# Patient Record
Sex: MALE | Race: BLACK OR AFRICAN AMERICAN | NOT HISPANIC OR LATINO | Employment: FULL TIME | URBAN - METROPOLITAN AREA
[De-identification: names, ages, dates, MRNs, and addresses within clinical notes are randomized per-mention and may not be internally consistent; named-entity substitution may affect disease eponyms.]

---

## 2017-03-06 ENCOUNTER — OFFICE VISIT (OUTPATIENT)
Dept: INTERNAL MEDICINE | Facility: CLINIC | Age: 52
End: 2017-03-06
Payer: COMMERCIAL

## 2017-03-06 VITALS
SYSTOLIC BLOOD PRESSURE: 132 MMHG | DIASTOLIC BLOOD PRESSURE: 76 MMHG | BODY MASS INDEX: 23.92 KG/M2 | WEIGHT: 170.88 LBS | HEIGHT: 71 IN | TEMPERATURE: 97 F | HEART RATE: 82 BPM | OXYGEN SATURATION: 98 %

## 2017-03-06 DIAGNOSIS — B18.2 HEP C W/O COMA, CHRONIC: ICD-10-CM

## 2017-03-06 DIAGNOSIS — B37.2 CUTANEOUS CANDIDIASIS: Primary | ICD-10-CM

## 2017-03-06 PROCEDURE — 99213 OFFICE O/P EST LOW 20 MIN: CPT | Mod: S$GLB,,, | Performed by: FAMILY MEDICINE

## 2017-03-06 PROCEDURE — 99999 PR PBB SHADOW E&M-EST. PATIENT-LVL III: CPT | Mod: PBBFAC,,, | Performed by: FAMILY MEDICINE

## 2017-03-06 PROCEDURE — 1160F RVW MEDS BY RX/DR IN RCRD: CPT | Mod: S$GLB,,, | Performed by: FAMILY MEDICINE

## 2017-03-06 RX ORDER — OXYCODONE HYDROCHLORIDE 5 MG/1
5 CAPSULE ORAL EVERY 4 HOURS PRN
COMMUNITY
End: 2017-04-18

## 2017-03-06 RX ORDER — OXYCODONE AND ACETAMINOPHEN 5; 325 MG/1; MG/1
TABLET ORAL
COMMUNITY
Start: 2017-02-23 | End: 2017-04-18

## 2017-03-06 RX ORDER — NYSTATIN AND TRIAMCINOLONE ACETONIDE 100000; 1 [USP'U]/G; MG/G
CREAM TOPICAL 2 TIMES DAILY
Qty: 15 G | Refills: 0 | Status: SHIPPED | OUTPATIENT
Start: 2017-03-06 | End: 2018-01-05

## 2017-03-06 RX ORDER — METHOCARBAMOL 500 MG/1
TABLET, FILM COATED ORAL
COMMUNITY
Start: 2017-02-24 | End: 2018-01-05

## 2017-03-06 RX ORDER — ACYCLOVIR 400 MG/1
TABLET ORAL
COMMUNITY
Start: 2017-02-16 | End: 2018-01-05 | Stop reason: SDUPTHER

## 2017-03-06 RX ORDER — FLUCONAZOLE 150 MG/1
150 TABLET ORAL DAILY
Qty: 1 TABLET | Refills: 0 | Status: SHIPPED | OUTPATIENT
Start: 2017-03-06 | End: 2017-03-07

## 2017-03-06 NOTE — MR AVS SNAPSHOT
[facility] Internal Medicine  [address] [facility] [address] [place] [address]  Phone: [phone]  Fax: [phone]                  Zane Allison   3/6/2017 2:20 PM   Office Visit    Description:  Male : 1965   Provider:  Ana Luisa Cohen MD   Department:  [facility] - Internal Medicine           Reason for Visit     Rash           Diagnoses this Visit        Comments    Cutaneous candidiasis    -  Primary     Hep C w/o coma, chronic                To Do List           Goals (5 Years of Data)     None      Follow-Up and Disposition     Return if symptoms worsen or fail to improve.       These Medications        Disp Refills Start End    nystatin-triamcinolone (MYCOLOG II) cream 15 g 0 3/6/2017     Apply topically 2 (two) times daily. - Topical (Top)    Pharmacy: [facility] [address] [place] [address] Ph #: [phone]       fluconazole (DIFLUCAN) 150 MG Tab 1 tablet 0 3/6/2017 3/7/2017    Take 1 tablet (150 mg total) by mouth once daily. - Oral    Pharmacy: [facility] [address] [place] [address] Ph #: [phone]         [facility] On Call     [facility] On Call Nurse Care Line -  Assistance  Registered nurses in the [facility] provide clinical advisement, health education, appointment booking, and other advisory services.  Call for this free service at [phone].             Medications           Message regarding Medications     Verify the changes and/or additions to your medication regime listed below are the same as discussed with your clinician today.  If any of these changes or additions are incorrect, please notify your healthcare provider.        START taking these NEW medications        Refills    nystatin-triamcinolone (MYCOLOG II) cream 0    Sig: Apply topically 2 (two) times daily.    Class: Normal    Route: Topical (Top)    fluconazole (DIFLUCAN) 150 MG Tab 0    Sig: Take 1 tablet (150 mg total) by mouth once daily.    Class: Normal    Route: Oral  "     STOP taking these medications     ledipasvir-sofosbuvir  mg Tab Take 1 tablet by mouth once daily.    tadalafil (CIALIS) 20 MG Tab Take 1 tablet (20 mg total) by mouth once daily.           Verify that the below list of medications is an accurate representation of the medications you are currently taking.  If none reported, the list may be blank. If incorrect, please contact your healthcare provider. Carry this list with you in case of emergency.           Current Medications     acyclovir (ZOVIRAX) 400 MG tablet     oxycodone (OXY-IR) 5 mg Cap Take 5 mg by mouth every 4 (four) hours as needed for Pain.    oxycodone-acetaminophen (PERCOCET) 5-325 mg per tablet     fluconazole (DIFLUCAN) 150 MG Tab Take 1 tablet (150 mg total) by mouth once daily.    methocarbamol (ROBAXIN) 500 MG Tab     nystatin-triamcinolone (MYCOLOG II) cream Apply topically 2 (two) times daily.           Clinical Reference Information           Your Vitals Were     BP Pulse Temp Height    132/76 (BP Location: Left arm, Patient Position: Sitting, BP Method: Manual) 82 96.7 °F (35.9 °C) (Tympanic) 5' 11" (1.803 m)    Weight SpO2 BMI    77.5 kg (170 lb 13.7 oz) 98% 23.83 kg/m2      Blood Pressure          Most Recent Value    BP  132/76      Allergies as of 3/6/2017     No Known Allergies      Immunizations Administered on Date of Encounter - 3/6/2017     None      Smoking Cessation     If you would like to quit smoking:   You may be eligible for free services if you are a [place] resident and started smoking cigarettes before September 1, 1988.  Call the [employer]oking [employer]essation [employer] (SCT) toll free at (567) 003-1048 or (120) 544-6090.   Call 7-583-QUIT-NOW if you do not meet the above criteria.            Language Assistance Services     ATTENTION: Language assistance services are available, free of charge. Please call [phone].      ATENCIÓN: Si habla español, tiene a elizondo disposición servicios gratuitos de asistencia lingüística. " Jaxon blas [phone].     GABINO Ý: N?u b?n nói Ti?ng Vi?t, có các d?ch v? h? tr? ngôn ng? mi?n phí dành cho b?n. G?i s? [phone].         [facility] - [facility] complies with applicable Federal civil rights laws and does not discriminate on the basis of race, color, national origin, age, disability, or sex.
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
## 2017-04-18 ENCOUNTER — OFFICE VISIT (OUTPATIENT)
Dept: INTERNAL MEDICINE | Facility: CLINIC | Age: 52
End: 2017-04-18
Payer: COMMERCIAL

## 2017-04-18 VITALS
OXYGEN SATURATION: 97 % | TEMPERATURE: 98 F | HEIGHT: 71 IN | WEIGHT: 179 LBS | SYSTOLIC BLOOD PRESSURE: 128 MMHG | BODY MASS INDEX: 25.06 KG/M2 | HEART RATE: 80 BPM | DIASTOLIC BLOOD PRESSURE: 72 MMHG

## 2017-04-18 DIAGNOSIS — Z00.00 ROUTINE GENERAL MEDICAL EXAMINATION AT A HEALTH CARE FACILITY: Primary | ICD-10-CM

## 2017-04-18 DIAGNOSIS — K64.4 EXTERNAL HEMORRHOIDS: ICD-10-CM

## 2017-04-18 PROCEDURE — 99396 PREV VISIT EST AGE 40-64: CPT | Mod: S$GLB,,, | Performed by: INTERNAL MEDICINE

## 2017-04-18 PROCEDURE — 99999 PR PBB SHADOW E&M-EST. PATIENT-LVL III: CPT | Mod: PBBFAC,,, | Performed by: INTERNAL MEDICINE

## 2017-04-18 RX ORDER — ZOLPIDEM TARTRATE 5 MG/1
5 TABLET ORAL NIGHTLY PRN
Qty: 10 TABLET | Refills: 5 | Status: SHIPPED | OUTPATIENT
Start: 2017-04-18 | End: 2018-06-07 | Stop reason: SDUPTHER

## 2017-04-18 RX ORDER — HYDROCORTISONE 25 MG/G
CREAM TOPICAL 2 TIMES DAILY
Qty: 28 G | Refills: 5 | Status: SHIPPED | OUTPATIENT
Start: 2017-04-18 | End: 2017-04-28

## 2017-04-18 RX ORDER — TADALAFIL 20 MG/1
20 TABLET ORAL DAILY
Qty: 5 TABLET | Refills: 11 | Status: SHIPPED | OUTPATIENT
Start: 2017-04-18 | End: 2018-01-05

## 2017-04-18 NOTE — PROGRESS NOTES
"HPI:  Patient is a 51-year-old man who comes in today for his annual physical exam.  He's not been seen in a couple years.  Since last time I saw him he had treated for hep C.  He totally cleared virus.  He is doing fine except for burning and itching in his rectum due to a hemorrhoid.  There is no other complaints      Current MEDS: medcard review, verified and update  Allergies: Per the electronic medical record    Past Medical History:   Diagnosis Date    Hep C w/o coma, chronic        Past Surgical History:   Procedure Laterality Date    injury      left eye       SHx: per the electronic medical record    FHx: recorded in the electronic medical record    ROS:    denies any chest pains or shortness of breath. Denies any nausea, vomiting or diarrhea. Denies any fever, chills or sweats. Denies any change in weight, voice, stool, skin or hair. Denies any dysuria, dyspepsia or dysphagia. Denies any change in vision, hearing or headaches. Denies any swollen lymph nodes or loss of memory.    PE:  /72  Pulse 80  Temp 97.7 °F (36.5 °C) (Tympanic)   Ht 5' 11" (1.803 m)  Wt 81.2 kg (179 lb 0.2 oz)  SpO2 97%  BMI 24.97 kg/m2  Gen: Well-developed, well-nourished, male, in no acute distress, oriented x3  HEENT: neck is supple, no adenopathy, carotids 2+ equal without bruits, thyroid exam normal size without nodules.  CHEST: clear to auscultation and percussion  CVS: regular rate and rhythm without significant murmur, gallop, or rubs  ABD: soft, benign, no rebound no guarding, no distention.  Bowel sounds are normal.     nontender.  No palpable masses.  No organomegaly and no audible bruits.  RECTAL: no masses.  Prostate 20  Grams without nodules.  He does have a small external hemorrhoid.  EXT: no clubbing, cyanosis, or edema  LYMPH: no cervical, inguinal, or axillary adenopathy  FEET: no loss of sensation.  No ulcers or pressure sores.  NEURO: gait normal.  Cranial nerves II- XII intact. No nystagmus.  Speech " normal.   Gross motor and sensory unremarkable.        Impression:  External hemorrhoids  Patient Active Problem List   Diagnosis    Hep C w/o coma, chronic       Plan:   Orders Placed This Encounter    CBC auto differential    Comprehensive metabolic panel    Lipid panel    TSH    PSA, Screening    hydrocortisone (ANUSOL-HC) 2.5 % cream    tadalafil (CIALIS) 20 MG Tab    zolpidem (AMBIEN) 5 MG Tab     He was advised to increase the fiber content in his diet.  He was given him some Anusol hydrocortisone cream for comfort relief.  He will have the above lab work done.  We will call him with results.

## 2017-10-19 ENCOUNTER — OFFICE VISIT (OUTPATIENT)
Dept: INTERNAL MEDICINE | Facility: CLINIC | Age: 52
End: 2017-10-19
Payer: COMMERCIAL

## 2017-10-19 ENCOUNTER — LAB VISIT (OUTPATIENT)
Dept: LAB | Facility: HOSPITAL | Age: 52
End: 2017-10-19
Attending: INTERNAL MEDICINE
Payer: COMMERCIAL

## 2017-10-19 VITALS
TEMPERATURE: 97 F | WEIGHT: 176.38 LBS | BODY MASS INDEX: 24.6 KG/M2 | SYSTOLIC BLOOD PRESSURE: 124 MMHG | HEART RATE: 88 BPM | DIASTOLIC BLOOD PRESSURE: 84 MMHG

## 2017-10-19 DIAGNOSIS — Z00.00 ROUTINE GENERAL MEDICAL EXAMINATION AT A HEALTH CARE FACILITY: ICD-10-CM

## 2017-10-19 DIAGNOSIS — Z00.00 ROUTINE GENERAL MEDICAL EXAMINATION AT A HEALTH CARE FACILITY: Primary | ICD-10-CM

## 2017-10-19 DIAGNOSIS — B35.6 TINEA INGUINALIS: ICD-10-CM

## 2017-10-19 DIAGNOSIS — Z12.11 COLON CANCER SCREENING: ICD-10-CM

## 2017-10-19 LAB
ALBUMIN SERPL BCP-MCNC: 4 G/DL
ALP SERPL-CCNC: 69 U/L
ALT SERPL W/O P-5'-P-CCNC: 14 U/L
ANION GAP SERPL CALC-SCNC: 12 MMOL/L
AST SERPL-CCNC: 20 U/L
BASOPHILS # BLD AUTO: 0.02 K/UL
BASOPHILS NFR BLD: 0.4 %
BILIRUB SERPL-MCNC: 0.3 MG/DL
BUN SERPL-MCNC: 11 MG/DL
CALCIUM SERPL-MCNC: 9.7 MG/DL
CHLORIDE SERPL-SCNC: 106 MMOL/L
CHOLEST SERPL-MCNC: 162 MG/DL
CHOLEST/HDLC SERPL: 4.1 {RATIO}
CO2 SERPL-SCNC: 26 MMOL/L
COMPLEXED PSA SERPL-MCNC: 0.5 NG/ML
CREAT SERPL-MCNC: 1.3 MG/DL
DIFFERENTIAL METHOD: ABNORMAL
EOSINOPHIL # BLD AUTO: 0.1 K/UL
EOSINOPHIL NFR BLD: 2 %
ERYTHROCYTE [DISTWIDTH] IN BLOOD BY AUTOMATED COUNT: 12 %
EST. GFR  (AFRICAN AMERICAN): >60 ML/MIN/1.73 M^2
EST. GFR  (NON AFRICAN AMERICAN): >60 ML/MIN/1.73 M^2
GLUCOSE SERPL-MCNC: 97 MG/DL
HCT VFR BLD AUTO: 47.1 %
HDLC SERPL-MCNC: 40 MG/DL
HDLC SERPL: 24.7 %
HGB BLD-MCNC: 15.7 G/DL
IMM GRANULOCYTES # BLD AUTO: 0.01 K/UL
IMM GRANULOCYTES NFR BLD AUTO: 0.2 %
LDLC SERPL CALC-MCNC: 91.4 MG/DL
LYMPHOCYTES # BLD AUTO: 2.8 K/UL
LYMPHOCYTES NFR BLD: 49.8 %
MCH RBC QN AUTO: 30.3 PG
MCHC RBC AUTO-ENTMCNC: 33.3 G/DL
MCV RBC AUTO: 91 FL
MONOCYTES # BLD AUTO: 0.4 K/UL
MONOCYTES NFR BLD: 6.8 %
NEUTROPHILS # BLD AUTO: 2.3 K/UL
NEUTROPHILS NFR BLD: 40.8 %
NONHDLC SERPL-MCNC: 122 MG/DL
NRBC BLD-RTO: 0 /100 WBC
PLATELET # BLD AUTO: 186 K/UL
PMV BLD AUTO: 11.6 FL
POTASSIUM SERPL-SCNC: 4.2 MMOL/L
PROT SERPL-MCNC: 8.6 G/DL
RBC # BLD AUTO: 5.18 M/UL
SODIUM SERPL-SCNC: 144 MMOL/L
TRIGL SERPL-MCNC: 153 MG/DL
TSH SERPL DL<=0.005 MIU/L-ACNC: 0.4 UIU/ML
WBC # BLD AUTO: 5.62 K/UL

## 2017-10-19 PROCEDURE — 99396 PREV VISIT EST AGE 40-64: CPT | Mod: S$GLB,,, | Performed by: INTERNAL MEDICINE

## 2017-10-19 PROCEDURE — 99999 PR PBB SHADOW E&M-EST. PATIENT-LVL III: CPT | Mod: PBBFAC,,, | Performed by: INTERNAL MEDICINE

## 2017-10-19 PROCEDURE — 85025 COMPLETE CBC W/AUTO DIFF WBC: CPT

## 2017-10-19 PROCEDURE — 84443 ASSAY THYROID STIM HORMONE: CPT

## 2017-10-19 PROCEDURE — 80061 LIPID PANEL: CPT

## 2017-10-19 PROCEDURE — 36415 COLL VENOUS BLD VENIPUNCTURE: CPT | Mod: PO

## 2017-10-19 PROCEDURE — 84153 ASSAY OF PSA TOTAL: CPT

## 2017-10-19 PROCEDURE — 80053 COMPREHEN METABOLIC PANEL: CPT
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
## 2017-10-26 RX ORDER — SODIUM, POTASSIUM,MAG SULFATES 17.5-3.13G
SOLUTION, RECONSTITUTED, ORAL ORAL
Qty: 354 ML | Refills: 0 | Status: SHIPPED | OUTPATIENT
Start: 2017-10-26 | End: 2018-01-05

## 2017-11-01 ENCOUNTER — HOSPITAL ENCOUNTER (OUTPATIENT)
Facility: HOSPITAL | Age: 52
Discharge: HOME | End: 2017-11-01
Attending: SURGERY | Admitting: SURGERY
Payer: COMMERCIAL

## 2017-11-01 ENCOUNTER — ANESTHESIA EVENT (OUTPATIENT)
Dept: ENDOSCOPY | Facility: HOSPITAL | Age: 52
End: 2017-11-01
Payer: COMMERCIAL

## 2017-11-01 ENCOUNTER — ANESTHESIA (OUTPATIENT)
Dept: ENDOSCOPY | Facility: HOSPITAL | Age: 52
End: 2017-11-01
Payer: COMMERCIAL

## 2017-11-01 ENCOUNTER — TELEPHONE (OUTPATIENT)
Dept: SURGERY | Facility: CLINIC | Age: 52
End: 2017-11-01

## 2017-11-01 VITALS
WEIGHT: 175 LBS | RESPIRATION RATE: 18 BRPM | BODY MASS INDEX: 24.5 KG/M2 | TEMPERATURE: 98 F | HEIGHT: 71 IN | DIASTOLIC BLOOD PRESSURE: 75 MMHG | SYSTOLIC BLOOD PRESSURE: 124 MMHG | OXYGEN SATURATION: 100 % | HEART RATE: 60 BPM

## 2017-11-01 VITALS — RESPIRATION RATE: 8 BRPM

## 2017-11-01 DIAGNOSIS — Z12.11 COLON CANCER SCREENING: Primary | ICD-10-CM

## 2017-11-01 PROCEDURE — 37000009 HC ANESTHESIA EA ADD 15 MINS: Performed by: SURGERY

## 2017-11-01 PROCEDURE — 45385 COLONOSCOPY W/LESION REMOVAL: CPT | Performed by: SURGERY

## 2017-11-01 PROCEDURE — 88305 TISSUE EXAM BY PATHOLOGIST: CPT | Performed by: PATHOLOGY

## 2017-11-01 PROCEDURE — 25000003 PHARM REV CODE 250: Performed by: SURGERY

## 2017-11-01 PROCEDURE — 27201089 HC SNARE, DISP (ANY): Performed by: SURGERY

## 2017-11-01 PROCEDURE — 25000003 PHARM REV CODE 250: Performed by: NURSE ANESTHETIST, CERTIFIED REGISTERED

## 2017-11-01 PROCEDURE — 88305 TISSUE EXAM BY PATHOLOGIST: CPT | Mod: 26,,, | Performed by: PATHOLOGY

## 2017-11-01 PROCEDURE — 45385 COLONOSCOPY W/LESION REMOVAL: CPT | Mod: 33,,, | Performed by: SURGERY

## 2017-11-01 PROCEDURE — 37000008 HC ANESTHESIA 1ST 15 MINUTES: Performed by: SURGERY

## 2017-11-01 PROCEDURE — 63600175 PHARM REV CODE 636 W HCPCS: Performed by: NURSE ANESTHETIST, CERTIFIED REGISTERED

## 2017-11-01 RX ORDER — LIDOCAINE HYDROCHLORIDE 10 MG/ML
INJECTION INFILTRATION; PERINEURAL
Status: DISCONTINUED | OUTPATIENT
Start: 2017-11-01 | End: 2017-11-01

## 2017-11-01 RX ORDER — SODIUM CHLORIDE 9 MG/ML
INJECTION, SOLUTION INTRAVENOUS CONTINUOUS
Status: DISCONTINUED | OUTPATIENT
Start: 2017-11-01 | End: 2017-11-01 | Stop reason: HOSPADM

## 2017-11-01 RX ORDER — HYDROCORTISONE 25 MG/G
CREAM TOPICAL 2 TIMES DAILY
Qty: 1 TUBE | Refills: 0 | Status: SHIPPED | OUTPATIENT
Start: 2017-11-01 | End: 2018-01-05

## 2017-11-01 RX ORDER — PROPOFOL 10 MG/ML
VIAL (ML) INTRAVENOUS
Status: DISCONTINUED | OUTPATIENT
Start: 2017-11-01 | End: 2017-11-01

## 2017-11-01 RX ADMIN — LIDOCAINE HYDROCHLORIDE 50 MG: 10 INJECTION, SOLUTION INFILTRATION; PERINEURAL at 01:11

## 2017-11-01 RX ADMIN — PROPOFOL 25 MG: 10 INJECTION, EMULSION INTRAVENOUS at 01:11

## 2017-11-01 RX ADMIN — PROPOFOL 70 MG: 10 INJECTION, EMULSION INTRAVENOUS at 01:11

## 2017-11-01 RX ADMIN — PROPOFOL 50 MG: 10 INJECTION, EMULSION INTRAVENOUS at 02:11

## 2017-11-01 RX ADMIN — PROPOFOL 50 MG: 10 INJECTION, EMULSION INTRAVENOUS at 01:11

## 2017-11-01 RX ADMIN — PROPOFOL 25 MG: 10 INJECTION, EMULSION INTRAVENOUS at 02:11

## 2017-11-01 RX ADMIN — SODIUM CHLORIDE: 0.9 INJECTION, SOLUTION INTRAVENOUS at 12:11

## 2017-11-01 RX ADMIN — PROPOFOL 30 MG: 10 INJECTION, EMULSION INTRAVENOUS at 01:11

## 2017-11-01 NOTE — ANESTHESIA POSTPROCEDURE EVALUATION
"Anesthesia Post Evaluation    Patient: Zane Allison    Procedure(s) Performed: Procedure(s) (LRB):  COLONOSCOPY (N/A)    Final Anesthesia Type: MAC  Patient location during evaluation: GI PACU  Patient participation: Yes- Able to Participate  Level of consciousness: awake and alert  Post-procedure vital signs: reviewed and stable  Pain management: adequate  Airway patency: patent  PONV status at discharge: No PONV  Anesthetic complications: no      Cardiovascular status: hemodynamically stable and blood pressure returned to baseline  Respiratory status: room air, unassisted and spontaneous ventilation  Hydration status: euvolemic  Follow-up not needed.        Visit Vitals  /88 (BP Location: Left arm, Patient Position: Lying)   Pulse (!) 55   Temp 36.8 °C (98.2 °F) (Oral)   Resp 18   Ht 5' 11" (1.803 m)   Wt 79.4 kg (175 lb)   SpO2 98%   BMI 24.41 kg/m²       Pain/Jose Score: Jose Score: 9 (11/1/2017  2:13 PM)      "

## 2017-11-01 NOTE — ANESTHESIA PREPROCEDURE EVALUATION
11/01/2017  Zane Allison is a 52 y.o., male.    Anesthesia Evaluation    I have reviewed the Patient Summary Reports.    I have reviewed the Nursing Notes.      Review of Systems  Anesthesia Hx:  No problems with previous Anesthesia Denies Hx of Anesthetic complications  History of prior surgery of interest to airway management or planning: Previous anesthesia: General Denies Family Hx of Anesthesia complications.   Denies Personal Hx of Anesthesia complications.   Social:  Smoker, Social Alcohol Use    Hematology/Oncology:  Hematology Normal   Oncology Normal     EENT/Dental:EENT/Dental Normal   Cardiovascular:  Cardiovascular Normal     Pulmonary:  Pulmonary Normal    Renal/:  Renal/ Normal     Hepatic/GI:   GERD, well controlled Liver Disease, Hepatitis, C    Musculoskeletal:  Musculoskeletal Normal    Neurological:  Neurology Normal    Endocrine:  Endocrine Normal    Dermatological:  Skin Normal    Psych:  Psychiatric Normal           Physical Exam  General:  Well nourished    Airway/Jaw/Neck:  Airway Findings: Mouth Opening: Normal Tongue: Normal  General Airway Assessment: Adult  Mallampati: II  TM Distance: Normal, at least 6 cm      Dental:  Dental Findings: In tact    Chest/Lungs:  Chest/Lungs Findings: Clear to auscultation, Normal Respiratory Rate     Heart/Vascular:  Heart Findings: Rate: Normal  Rhythm: Regular Rhythm  Sounds: Normal        Mental Status:  Mental Status Findings:  Cooperative, Alert and Oriented         Anesthesia Plan  Type of Anesthesia, risks & benefits discussed:  Anesthesia Type:  MAC  Patient's Preference:   Intra-op Monitoring Plan: standard ASA monitors  Intra-op Monitoring Plan Comments:   Post Op Pain Control Plan:   Post Op Pain Control Plan Comments:   Induction:   IV  Beta Blocker:  Patient is not currently on a Beta-Blocker (No further documentation  required).       Informed Consent: Patient understands risks and agrees with Anesthesia plan.  Questions answered. Anesthesia consent signed with patient.  ASA Score: 2     Day of Surgery Review of History & Physical:            Ready For Surgery From Anesthesia Perspective.

## 2017-11-01 NOTE — DISCHARGE INSTRUCTIONS
Hemorrhoids    Hemorrhoids are swollen and inflamed veins inside the rectum and near the anus. The rectum is the last several inches of the colon. The anus is the passage between the rectum and the outside of the body.  Causes  The veins can become swollen due to increased pressure in them. This is most often caused by:  · Chronic constipation or diarrhea  · Straining when having a bowel movement  · Sitting too long on the toilet  · A low-fiber diet  · Pregnancy  Symptoms  · Bleeding from the rectum (this may be noticeable after bowel movements)  · Lump near the anus  · Itching around the anus  · Pain around the anus  There are different types of hemorrhoids. Depending on the type you have and the severity, you may be able to treat yourself at home. In some cases, a procedure may be the best treatment option. Your healthcare provider can tell you more about this, if needed.  Home care  General care  · To get relief from pain or itching, try:  ¨ Topical products. Your healthcare provider may prescribe or recommend creams, ointments, or pads that can be applied to the hemorrhoid. Use these exactly as directed.  ¨ Medicines. Your healthcare provider may recommend stool softeners, suppositories, or laxatives to help manage constipation. Use these exactly as directed.  ¨ Sitz baths. A sitz bath involves sitting in a few inches of warm bath water. Be careful not to make the water so hot that you burn yourself--test it before sitting in it. Soak for about 10 to 15 minutes a few times a day. This may help relieve pain.  Tips to help prevent hemorrhoids  · Eat more fiber. Fiber adds bulk to stool and absorbs water as it moves through your colon. This makes stool softer and easier to pass.  ¨ Increase the fiber in your diet with more fiber-rich foods. These include fresh fruit, vegetables, and whole grains.  ¨ Take a fiber supplement or bulking agent, if advised to by your provider. These include products such as psyllium  or methylcellulose.  · Drink plenty of water, if directed to by your provider. This can help keep stool soft.  · Be more active. Frequent exercise aids digestion and helps prevent constipation. It may also help make bowel movements more regular.  · Dont strain during bowel movements. This can make hemorrhoids more likely. Also, dont sit on the toilet for long periods of time.  Follow-up care  Follow up with your healthcare provider, or as advised. If a culture or imaging tests were done, you will be notified of the results when they are ready. This may take a few days or longer.  When to seek medical advice  Call your healthcare provider right away if any of these occur:  · Increased bleeding from the rectum  · Increased pain around the rectum or anus  · Weakness or dizziness  Call 911  Call 911 or return to the emergency department right away if any of these occur:  · Trouble breathing or swallowing  · Fainting or loss of consciousness  · Unusually fast heart rate  · Vomiting blood  · Large amounts of blood in stool  Date Last Reviewed: 6/22/2015 © 2000-2017 [employer]. [address] [place] [address]. All rights reserved. This information is not intended as a substitute for professional medical care. Always follow your healthcare professional's instructions.        Understanding Colon and Rectal Polyps    The colon (also called the large intestine) is a muscular tube that forms the last part of the digestive tract. It absorbs water and stores food waste. The colon is about 4 to 6 feet long. The rectum is the last 6 inches of the colon. The colon and rectum have a smooth lining composed of millions of cells. Changes in these cells can lead to growths in the colon that can become cancerous and should be removed. Multiple tests are available to screen for colon cancer, but the colonoscopy is the most recommended test. During colonoscopy, these polyps can be removed. How often you need this  test depends on many things including your condition, your family history, symptoms, and what the findings were at the previous colonoscopy.   When the colon lining changes  Changes that happen in the cells that line the colon or rectum can lead to growths called polyps. Over a period of years, polyps can turn cancerous. Removing polyps early may prevent cancer from ever forming.  Polyps  Polyps are fleshy clumps of tissue that form on the lining of the colon or rectum. Small polyps are usually benign (not cancerous). However, over time, cells in a polyp can change and become cancerous. Certain types of polyps known as adenomatous polyps are premalignant. The risk for invasive cancer increases with the size of the polyp and certain cell and gene features. This means that they can become cancerous if they're not removed. Hyperplastic polyps are benign. They can grow quite large and not turn cancerous.   Cancer  Almost all colorectal cancers start when polyp cells begin growing abnormally. As a cancerous tumor grows, it may involve more and more of the colon or rectum. In time, cancer can also grow beyond the colon or rectum and spread to nearby organs or to glands called lymph nodes. The cells can also travel to other parts of the body. This is known as metastasis. The earlier a cancerous tumor is removed, the better the chance of preventing its spread.    Date Last Reviewed: 8/1/2016  © 2561-6813 [employer]. [address] [place] [address], [place] [address]. All rights reserved. This information is not intended as a substitute for professional medical care. Always follow your healthcare professional's instructions.

## 2017-11-01 NOTE — TELEPHONE ENCOUNTER
S/W via phone informed pt New Rx (s) was sent via pharmacy on file Fiber in diet via [facility], follow discharge orders per Dr. Alcocer,and to call with any questions/concerns. Voiced understanding

## 2017-11-01 NOTE — TELEPHONE ENCOUNTER
----- Message from Jose Alcocer MD sent at 11/1/2017  4:30 PM CDT -----  Please let patient know I sent anusol and metamucil to pharmacy for him. Also can we send him this info on fiber in his diet. Thanks....        [facility]  DIET RECOMMENDATIONS    Fruits:  Use all fruits and juices liberally; fresh, cooked, dried or canned. Eat fruit raw and with skins when possible. Have at least four servings of fruit daily including a citrus fruit and a stewed dried fruit. Hard seeds of fruits (berries, figs, Grapes, mangoes, tomatoes) etc. may be removed.    Vegetables: Use all vegetables liberally. Green leafy vegetables, such as cabbage, spinach, lettuce, broccoli, and other greens are particularly good.    Potato: As desired. Serve baked frequently and eat the skin. Other starchy foods such as rice, macaroni, etc., may be occasionally substituted. Chew popcorn well and do not eat hard kernels.    Meat, Fish, Poultry: One or two servings daily.    Eggs: One daily if you are not on a low cholesterol diet.    Milk: Include at least one-half pint daily. Whole milk or skimmed may be used.     Cereals: Use whole grain breads and cereals such as bran, bran flakes, grape nut flakes, puffed wheat, oatmeal, Wheaties, etc., as much as possible. Refined breaks and cereals are not restricted; however, they do not contain the bulk necessary for this diet.     Sugars, Sweets: Use very moderately. Depend on fruit as dessert.    Fats: Use butter or margarine as desired. Oil or dressing on salads as desired. Fried foods may be used in moderation. Nuts may be eaten if you chew them well and may be ground or finely chopped for cooking.   Sample Menu                                                                                 Breakfast                          Lunch  Orange juice, 4 ounces                                                Vegetable soup                    Stewed fruit                                                                  Fresh fruit plate with cottage cheese  Shredded wheat                                                           Whole wheat toast  Scrambled eggs                                                           Butter  Whole wheat toast                                                       Coffee or tea  Dinner                                                                         Bedtime  Large glass tomato juice                                             1 glass milk  Roast beef                                                                   stewed fruit  Baked potato with skin  Buttered spinach  Raw vegetable salad  Baked apple with skin   Coffee or tea      [facility]  High Fiber Diet    15 grams of fiber per day is recommended  Fiber cereal = 5 grams (Raisin Bran, Shredded Wheat, Grape Nuts)  Konsyl 1 teaspoon = 6 grams  Metamucil 1 tablespoon = 3 grams  Citrucel 1 tablespoon = 2 grams  Fiber Choice = 3-4 per day    This diet furnishes adequate amounts of all the essential nutrients needed by the body and a very liberal fiber or roughage content. Roughage is indigestible fiber found in fruits, vegetables and whole grain cereals. It provides bulk to the large intestine and, accompanied by an adequate fluid intake, is a stimulant to elimination. Regular eating and elimination habits are vital to good health.     How to Increase Your Fiber Intake    Drink at least 4-5 glasses of liquids per day or the fiber can be constipating rather then stimulating to your gut.  Boil and bake potatoes in their skin. Eat the skin, too.  Include fresh fruits and raw vegetables in your daily diet. Raw fruits and vegetables have more useful fiber than those that have been peeled, cooked, pureed, or otherwise processed.  Eat a wide variety of fibrous foods in reasonable amounts. Increase fiber intake slowly especially if you have been on a low-fiber diet.  Eat more legumes-peas, beans, soybeans.  For  snacks, try dried fruit, whole wheat and rye crackers.  Avoid instant-cook hot cereals. Use the longer cooking cereals.  Use bran whenever possible. Sprinkle it on top of cereal, mix it into mashed potatoes or hamburger meat, or use it in combination dishes such as meat loaf.   Substitute whole grain, whole wheat and bran products for white flour products.  Eat slowly and chew your food thoroughly.

## 2017-11-01 NOTE — PLAN OF CARE
Dr Alcocer came to bedside and discussed findings. NO N/V,  no abdominal pain, no GI bleeding, and vitals stable.  Pt discharged from unit.

## 2017-11-01 NOTE — BRIEF OP NOTE
[facility]  Brief Operative Note     SUMMARY     Surgery Date: 11/1/2017     Surgeon(s) and Role:     * Jose Alcocer MD - Primary    Assisting Surgeon: None    Pre-op Diagnosis:  Colon cancer screening [Z12.11]    Post-op Diagnosis:  Post-Op Diagnosis Codes:     * Colon cancer screening [Z12.11]    Procedure(s) (LRB):  COLONOSCOPY (N/A)    Anesthesia: Choice    Description of the findings of the procedure: colonoscopy    Findings/Key Components: polyps    Estimated Blood Loss: minimal         Specimens:   Specimen (12h ago through future)    Start     Ordered    11/01/17 1338  Specimen to Pathology - Surgery  Once     Comments:  1. Descending colon polyp2. Cecal polyp3. Hepatic flexure polyp      11/01/17 1409          Discharge Note    SUMMARY     Admit Date: 11/1/2017    Discharge Date and Time:  11/01/2017 2:29 PM    Hospital Course the patient underwent colonoscopy and was discharged post op.    Final Diagnosis: Post-Op Diagnosis Codes:     * Colon cancer screening [Z12.11]    Disposition: Home or Self Care    Follow Up/Patient Instructions:     Medications:  Reconciled Home Medications:   Current Discharge Medication List      CONTINUE these medications which have NOT CHANGED    Details   methocarbamol (ROBAXIN) 500 MG Tab       nystatin-triamcinolone (MYCOLOG II) cream Apply topically 2 (two) times daily.  Qty: 15 g, Refills: 0    Associated Diagnoses: Cutaneous candidiasis      acyclovir (ZOVIRAX) 400 MG tablet       hydrocortisone (ANUSOL-HC) 2.5 % cream Apply topically 2 (two) times daily.  Qty: 28 g, Refills: 5      sodium,potassium,mag sulfates (SUPREP BOWEL PREP KIT) 17.5-3.13-1.6 gram SolR As directed  Qty: 354 mL, Refills: 0      tadalafil (CIALIS) 20 MG Tab Take 1 tablet (20 mg total) by mouth once daily.  Qty: 5 tablet, Refills: 11      zolpidem (AMBIEN) 5 MG Tab Take 1 tablet (5 mg total) by mouth nightly as needed.  Qty: 10 tablet, Refills: 5             Discharge Procedure  Orders  Diet general     Activity as tolerated     Call MD for:  temperature >100.4     Call MD for:  persistent nausea and vomiting     Call MD for:  severe uncontrolled pain     Call MD for:  difficulty breathing, headache or visual disturbances     Call MD for:  redness, tenderness, or signs of infection (pain, swelling, redness, odor or green/yellow discharge around incision site)     Call MD for:  hives     Call MD for:  persistent dizziness or light-headedness     Call MD for:  extreme fatigue     No dressing needed       Follow-up Information     López Escalona MD.    Specialty:  Internal Medicine  Why:  As needed  Contact information:  [address]  [address]  [facility] [address] [phone]

## 2017-11-01 NOTE — TRANSFER OF CARE
"Anesthesia Transfer of Care Note    Patient: Zane Allison    Procedure(s) Performed: Procedure(s) (LRB):  COLONOSCOPY (N/A)    Patient location: Other: GI PACU    Anesthesia Type: MAC    Transport from OR: Transported from OR on room air with adequate spontaneous ventilation    Post pain: adequate analgesia    Post assessment: no apparent anesthetic complications    Post vital signs: stable    Level of consciousness: awake    Nausea/Vomiting: no nausea/vomiting    Complications: none    Transfer of care protocol was followed      Last vitals:   Visit Vitals  /88 (BP Location: Left arm, Patient Position: Lying)   Pulse (!) 55   Temp 36.8 °C (98.2 °F) (Oral)   Resp 18   Ht 5' 11" (1.803 m)   Wt 79.4 kg (175 lb)   SpO2 98%   BMI 24.41 kg/m²     "

## 2017-11-01 NOTE — H&P
[facility] -   General Surgery  History & Physical    Subjective:     Chief Complaint/Reason for Admission: screening colonoscopy    History of Present Illness:  Patient is a 52 y.o. male presents for screening colonoscopy. He has never had a colonoscopy before. Denies any weight changes, changes in bowels or bloody bowel movements. Family history of colon cancer in cousin, uncle.    No current facility-administered medications on file prior to encounter.      Current Outpatient Prescriptions on File Prior to Encounter   Medication Sig    methocarbamol (ROBAXIN) 500 MG Tab     nystatin-triamcinolone (MYCOLOG II) cream Apply topically 2 (two) times daily.    acyclovir (ZOVIRAX) 400 MG tablet     hydrocortisone (ANUSOL-HC) 2.5 % cream Apply topically 2 (two) times daily.    tadalafil (CIALIS) 20 MG Tab Take 1 tablet (20 mg total) by mouth once daily.    zolpidem (AMBIEN) 5 MG Tab Take 1 tablet (5 mg total) by mouth nightly as needed.       Review of patient's allergies indicates:  No Known Allergies    Past Medical History:   Diagnosis Date    History of hepatitis C     Treated successfully 2016     Past Surgical History:   Procedure Laterality Date    ANKLE SURGERY      injury      left eye     Family History     Problem Relation (Age of Onset)    Cataracts Maternal Uncle    Diabetes Maternal Aunt    Hypertension Maternal Aunt, Maternal Uncle    Thyroid disease Mother, Maternal Aunt        Social History Main Topics    Smoking status: Current Every Day Smoker     Packs/day: 1.00     Years: 20.00     Types: Cigarettes     Start date: 1/1/1993    Smokeless tobacco: Never Used    Alcohol use Yes      Comment: weekends beer / stopped 01/2015    Drug use: No    Sexual activity: Yes     Partners: Female     Review of Systems   Constitutional: Negative for chills, fever and unexpected weight change.   HENT: Negative for congestion.    Eyes: Negative for visual disturbance.   Respiratory: Negative  for shortness of breath.    Cardiovascular: Negative for chest pain.   Gastrointestinal: Negative for abdominal distention, abdominal pain, constipation, nausea, rectal pain and vomiting.   Genitourinary: Negative for dysuria.   Musculoskeletal: Negative for arthralgias.   Skin: Negative for rash.   Neurological: Negative for light-headedness.   Hematological: Negative for adenopathy.     Objective:     Vital Signs (Most Recent):  Temp: 98.2 °F (36.8 °C) (11/01/17 1220)  Pulse: (!) 55 (11/01/17 1220)  Resp: 18 (11/01/17 1220)  BP: 137/88 (11/01/17 1220)  SpO2: 98 % (11/01/17 1220) Vital Signs (24h Range):  Temp:  [98.2 °F (36.8 °C)] 98.2 °F (36.8 °C)  Pulse:  [55] 55  Resp:  [18] 18  SpO2:  [98 %] 98 %  BP: (137)/(88) 137/88     Weight: 79.4 kg (175 lb)  Body mass index is 24.41 kg/m².    Physical Exam   Constitutional: He is oriented to person, place, and time. He appears well-developed and well-nourished.   HENT:   Head: Normocephalic and atraumatic.   Eyes: EOM are normal.   Neck: Normal range of motion. Neck supple.   Cardiovascular: Normal rate and regular rhythm.    Pulmonary/Chest: Effort normal and breath sounds normal.   Abdominal: Soft. Bowel sounds are normal. He exhibits no distension. There is no tenderness.   Neurological: He is alert and oriented to person, place, and time.   Skin: Skin is warm and dry.   Vitals reviewed.      Assessment/Plan:     53 y/o male in need on colon cancer screening    Colonoscopy today  Risks and benefits discussed with patient including: pain, bleeding, infection, injury to colon/perforation, need for further procedure    Jose Alcocer MD  General Surgery  [facility]

## 2017-11-01 NOTE — ANESTHESIA RELEASE NOTE
"Anesthesia Release from PACU Note    Patient: Zane Allison    Procedure(s) Performed: Procedure(s) (LRB):  COLONOSCOPY (N/A)    Anesthesia type: MAC    Post pain: Adequate analgesia    Post assessment: no apparent anesthetic complications, tolerated procedure well and no evidence of recall    Last Vitals:   Visit Vitals  /88 (BP Location: Left arm, Patient Position: Lying)   Pulse (!) 55   Temp 36.8 °C (98.2 °F) (Oral)   Resp 18   Ht 5' 11" (1.803 m)   Wt 79.4 kg (175 lb)   SpO2 98%   BMI 24.41 kg/m²       Post vital signs: stable    Level of consciousness: awake    Nausea/Vomiting: no nausea/no vomiting    Complications: none    Airway Patency: patent    Respiratory: unassisted, spontaneous ventilation, room air    Cardiovascular: stable and blood pressure at baseline    Hydration: euvolemic  "

## 2017-11-08 ENCOUNTER — TELEPHONE (OUTPATIENT)
Dept: SURGERY | Facility: CLINIC | Age: 52
End: 2017-11-08

## 2017-11-08 NOTE — TELEPHONE ENCOUNTER
Discuss pathology from colonoscopy with patient:     FINAL PATHOLOGIC DIAGNOSIS  1  Descending colon polyp:  Normal colonic mucosa with lymphoid aggregate.  2  Cecal polyp:  Tubular adenoma.  3  Hepatic flexure polyp:  Tubular adenoma.    Recommended that he have a repeat colonoscopy in 3 years.

## 2018-01-05 ENCOUNTER — DOCUMENTATION ONLY (OUTPATIENT)
Dept: INTERNAL MEDICINE | Facility: CLINIC | Age: 53
End: 2018-01-05

## 2018-01-05 ENCOUNTER — TELEPHONE (OUTPATIENT)
Dept: INTERNAL MEDICINE | Facility: CLINIC | Age: 53
End: 2018-01-05

## 2018-01-05 ENCOUNTER — OFFICE VISIT (OUTPATIENT)
Dept: INTERNAL MEDICINE | Facility: CLINIC | Age: 53
End: 2018-01-05
Payer: COMMERCIAL

## 2018-01-05 VITALS
WEIGHT: 187.38 LBS | HEART RATE: 85 BPM | SYSTOLIC BLOOD PRESSURE: 138 MMHG | OXYGEN SATURATION: 95 % | BODY MASS INDEX: 26.23 KG/M2 | HEIGHT: 71 IN | TEMPERATURE: 97 F | DIASTOLIC BLOOD PRESSURE: 86 MMHG

## 2018-01-05 DIAGNOSIS — M54.12 CERVICAL RADICULOPATHY: ICD-10-CM

## 2018-01-05 DIAGNOSIS — B35.6 JOCK ITCH: Primary | ICD-10-CM

## 2018-01-05 PROCEDURE — 99213 OFFICE O/P EST LOW 20 MIN: CPT | Mod: S$GLB,,, | Performed by: INTERNAL MEDICINE

## 2018-01-05 PROCEDURE — 99999 PR PBB SHADOW E&M-EST. PATIENT-LVL III: CPT | Mod: PBBFAC,,, | Performed by: INTERNAL MEDICINE

## 2018-01-05 RX ORDER — TIZANIDINE 4 MG/1
4 TABLET ORAL EVERY 8 HOURS PRN
Qty: 90 TABLET | Refills: 5 | Status: SHIPPED | OUTPATIENT
Start: 2018-01-05 | End: 2018-01-15

## 2018-01-05 RX ORDER — FLUCONAZOLE 100 MG/1
100 TABLET ORAL DAILY
Qty: 10 TABLET | Refills: 0 | Status: SHIPPED | OUTPATIENT
Start: 2018-01-05 | End: 2018-01-15

## 2018-01-05 RX ORDER — ACYCLOVIR 400 MG/1
400 TABLET ORAL DAILY
Qty: 30 TABLET | Refills: 11 | Status: SHIPPED | OUTPATIENT
Start: 2018-01-05 | End: 2018-10-30

## 2018-01-05 RX ORDER — MELOXICAM 15 MG/1
15 TABLET ORAL DAILY
Qty: 30 TABLET | Refills: 5 | Status: SHIPPED | OUTPATIENT
Start: 2018-01-05 | End: 2018-10-19 | Stop reason: SDUPTHER

## 2018-01-05 NOTE — TELEPHONE ENCOUNTER
----- Message from Wayne Dorantes sent at 1/5/2018  9:29 AM CST -----  Contact: Pt   .pt called at 9:28am will be 10 mins late...[phone]

## 2018-01-05 NOTE — PROGRESS NOTES
"HPI:  Patient is a 52-year-old man who comes in today with complaints of right shoulder pain.  There really radiate states from his neck for the last several weeks.  He denies any trauma or injury.  The pain starts in the right side of the neck and then goes into the right upper shoulder.  He denies any pain down his lower arm.  He also complains of a recurrent rash in his inguinal groin area.    Current meds have been verified and updated per the EMR  Exam:/86 (BP Location: Left arm, Patient Position: Sitting, BP Method: Medium (Manual))   Pulse 85   Temp 97.4 °F (36.3 °C) (Tympanic)   Ht 5' 10.5" (1.791 m)   Wt 85 kg (187 lb 6.3 oz)   SpO2 95%   BMI 26.51 kg/m²   Shoulder has full range of motion.  Neck does not reveal any impingement sign.  He has full range of motion of the cervical spine.  Skin reveals tinea in the inguinal area    Lab Results   Component Value Date    WBC 5.62 10/19/2017    HGB 15.7 10/19/2017    HCT 47.1 10/19/2017     10/19/2017    CHOL 162 10/19/2017    TRIG 153 (H) 10/19/2017    HDL 40 10/19/2017    ALT 14 10/19/2017    AST 20 10/19/2017     10/19/2017    K 4.2 10/19/2017     10/19/2017    CREATININE 1.3 10/19/2017    BUN 11 10/19/2017    CO2 26 10/19/2017    TSH 0.400 10/19/2017    PSA 0.50 10/19/2017       Impression:  Tinea david  Mild cervical radiculopathy  Patient Active Problem List   Diagnosis   (none) - all problems resolved or deleted       Plan:  Orders Placed This Encounter    meloxicam (MOBIC) 15 MG tablet    tiZANidine (ZANAFLEX) 4 MG tablet    acyclovir (ZOVIRAX) 400 MG tablet    fluconazole (DIFLUCAN) 100 MG tablet     He was instructed that he must keep his groin area extremely dry.  We discussed many ways to do that.  He was given muscle relaxers, anti-inflammatories for his neck.  He was also given Diflucan for his jock itch.  He was told to use over-the-counter Lamisil cream as well.    "

## 2018-03-08 ENCOUNTER — TELEPHONE (OUTPATIENT)
Dept: INTERNAL MEDICINE | Facility: CLINIC | Age: 53
End: 2018-03-08

## 2018-03-08 NOTE — TELEPHONE ENCOUNTER
----- Message from Jessika Florez sent at 3/8/2018 10:19 AM CST -----  Contact: Patients wife, Jennifer  Ms Jennifer has a pre-op clearance for for her  and needs to know if it can just be filled out or does he need to come in, please call her back at [phone]. Thank you

## 2018-03-09 ENCOUNTER — OFFICE VISIT (OUTPATIENT)
Dept: OPHTHALMOLOGY | Facility: CLINIC | Age: 53
End: 2018-03-09
Payer: COMMERCIAL

## 2018-03-09 DIAGNOSIS — H11.153 PINGUECULA OF BOTH EYES: Primary | ICD-10-CM

## 2018-03-09 DIAGNOSIS — H16.9 KERATITIS: ICD-10-CM

## 2018-03-09 PROCEDURE — 99999 PR PBB SHADOW E&M-EST. PATIENT-LVL I: CPT | Mod: PBBFAC,,, | Performed by: OPTOMETRIST

## 2018-03-09 PROCEDURE — 92004 COMPRE OPH EXAM NEW PT 1/>: CPT | Mod: S$GLB,,, | Performed by: OPTOMETRIST
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
## 2018-06-07 NOTE — TELEPHONE ENCOUNTER
----- Message from Rosa Pierre sent at 6/7/2018 12:33 PM CDT -----  Contact: pt   1. What is the name of the medication you are requesting? zolpidem (AMBIEN)  2. What is the dose? 5 mg   3. How do you take the medication? Orally, topically, etc? Orally   4. How often do you take this medication? Nighty as needed   5. Do you need a 30 day or 90 day supply? 30  6. How many refills are you requesting? 1  7. What is your preferred pharmacy and location of the pharmacy?   [facility] [address] [place] [address]  Phone: [phone] Fax: [phone]  8. Who can we contact with further questions? the patient       Pt states that he would like to get med increase to 10 mg

## 2018-06-08 RX ORDER — ZOLPIDEM TARTRATE 5 MG/1
5 TABLET ORAL NIGHTLY PRN
Qty: 10 TABLET | Refills: 2 | Status: SHIPPED | OUTPATIENT
Start: 2018-06-08 | End: 2018-10-09 | Stop reason: SDUPTHER

## 2018-06-27 ENCOUNTER — TELEPHONE (OUTPATIENT)
Dept: INTERNAL MEDICINE | Facility: CLINIC | Age: 53
End: 2018-06-27

## 2018-06-27 RX ORDER — MECLIZINE HCL 12.5 MG 12.5 MG/1
12.5 TABLET ORAL 3 TIMES DAILY PRN
Qty: 60 TABLET | Refills: 11 | Status: SHIPPED | OUTPATIENT
Start: 2018-06-27 | End: 2018-10-30

## 2018-06-27 RX ORDER — TADALAFIL 20 MG/1
20 TABLET ORAL DAILY
Qty: 30 TABLET | Refills: 11 | Status: SHIPPED | OUTPATIENT
Start: 2018-06-27 | End: 2018-07-18

## 2018-06-27 NOTE — TELEPHONE ENCOUNTER
----- Message from Stefanie Craft sent at 6/27/2018 10:13 AM CDT -----  Contact: pt  Pt is requesting a call back from the nurse  In regards to pt getting a refill.  [phone] (home)         1. What is the name of the medication you are requesting? Cialis  2. What is the dose?   3. How do you take the medication? Orally, topically, etc? Orally  4. How often do you take this medication? As needed  5. Do you need a 30 day or 90 day supply? 30 day  6. How many refills are you requesting? Per   7. What is your preferred pharmacy and location of the pharmacy?     [facility] [address] [place] [id number] [facility]  [id number] [place] [address]  Phone: [phone] Fax: [phone]    8. Who can we contact with further questions? Pt

## 2018-06-27 NOTE — TELEPHONE ENCOUNTER
----- Message from Sobia Guevara sent at 6/27/2018  3:21 PM CDT -----  Contact: Pt.   Pt is calling regarding refill on script Cialis. ...[phone] (home)       ..  [facility] [address] [place] [address]  Phone: [phone] Fax: [phone]

## 2018-07-18 ENCOUNTER — TELEPHONE (OUTPATIENT)
Dept: INTERNAL MEDICINE | Facility: CLINIC | Age: 53
End: 2018-07-18

## 2018-07-18 RX ORDER — TADALAFIL 20 MG/1
20 TABLET ORAL DAILY
Qty: 30 TABLET | Refills: 11 | OUTPATIENT
Start: 2018-07-18 | End: 2019-07-18

## 2018-07-18 RX ORDER — SILDENAFIL CITRATE 20 MG/1
TABLET ORAL
Qty: 100 TABLET | Refills: 1 | Status: SHIPPED | OUTPATIENT
Start: 2018-07-18 | End: 2018-10-09

## 2018-07-18 NOTE — TELEPHONE ENCOUNTER
Call pt and tell him that I sent it to a mail order company named [facility] out of [place]. They will call him with the pricing. Tell him it is the cheapest I know about.

## 2018-07-18 NOTE — TELEPHONE ENCOUNTER
----- Message from Rosalie Allison sent at 7/18/2018 11:43 AM CDT -----  Contact: pt  Calling in regards to RX medication is to expensive and if you can request something cheaper to be sent to [facility] in [place]  and please advise [phone] (home)

## 2018-07-18 NOTE — TELEPHONE ENCOUNTER
Patient notified that prescription was sent in on 06/27/18 to the [facility] in [place]. He states that he never got it he will check with the [facility].

## 2018-07-18 NOTE — TELEPHONE ENCOUNTER
Patient notified mail order company will contact him regarding prescription. He verbalized understanding.

## 2018-10-09 ENCOUNTER — OFFICE VISIT (OUTPATIENT)
Dept: INTERNAL MEDICINE | Facility: CLINIC | Age: 53
End: 2018-10-09
Payer: COMMERCIAL

## 2018-10-09 VITALS
OXYGEN SATURATION: 98 % | WEIGHT: 175.94 LBS | HEART RATE: 64 BPM | HEIGHT: 71 IN | SYSTOLIC BLOOD PRESSURE: 140 MMHG | BODY MASS INDEX: 24.63 KG/M2 | TEMPERATURE: 98 F | DIASTOLIC BLOOD PRESSURE: 80 MMHG | RESPIRATION RATE: 16 BRPM

## 2018-10-09 DIAGNOSIS — S43.421A SPRAIN OF RIGHT ROTATOR CUFF CAPSULE, INITIAL ENCOUNTER: Primary | ICD-10-CM

## 2018-10-09 PROCEDURE — 3008F BODY MASS INDEX DOCD: CPT | Mod: CPTII,S$GLB,, | Performed by: INTERNAL MEDICINE

## 2018-10-09 PROCEDURE — 99213 OFFICE O/P EST LOW 20 MIN: CPT | Mod: S$GLB,,, | Performed by: INTERNAL MEDICINE

## 2018-10-09 PROCEDURE — 99999 PR PBB SHADOW E&M-EST. PATIENT-LVL III: CPT | Mod: PBBFAC,,, | Performed by: INTERNAL MEDICINE

## 2018-10-09 RX ORDER — ZOLPIDEM TARTRATE 5 MG/1
5 TABLET ORAL NIGHTLY PRN
Qty: 10 TABLET | Refills: 5 | Status: SHIPPED | OUTPATIENT
Start: 2018-10-09 | End: 2019-06-03 | Stop reason: SDUPTHER

## 2018-10-09 RX ORDER — TADALAFIL 20 MG/1
TABLET ORAL
Qty: 5 TABLET | Refills: 11 | Status: SHIPPED | OUTPATIENT
Start: 2018-10-09 | End: 2018-10-19
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
## 2018-10-12 ENCOUNTER — PATIENT OUTREACH (OUTPATIENT)
Dept: ADMINISTRATIVE | Facility: HOSPITAL | Age: 53
End: 2018-10-12

## 2018-10-19 ENCOUNTER — OFFICE VISIT (OUTPATIENT)
Dept: INTERNAL MEDICINE | Facility: CLINIC | Age: 53
End: 2018-10-19
Payer: COMMERCIAL

## 2018-10-19 ENCOUNTER — LAB VISIT (OUTPATIENT)
Dept: LAB | Facility: HOSPITAL | Age: 53
End: 2018-10-19
Attending: INTERNAL MEDICINE
Payer: COMMERCIAL

## 2018-10-19 VITALS
HEART RATE: 75 BPM | DIASTOLIC BLOOD PRESSURE: 86 MMHG | OXYGEN SATURATION: 95 % | SYSTOLIC BLOOD PRESSURE: 128 MMHG | WEIGHT: 176.13 LBS | TEMPERATURE: 98 F | BODY MASS INDEX: 24.66 KG/M2 | HEIGHT: 71 IN

## 2018-10-19 DIAGNOSIS — Z00.00 ROUTINE GENERAL MEDICAL EXAMINATION AT A HEALTH CARE FACILITY: ICD-10-CM

## 2018-10-19 DIAGNOSIS — Z00.00 ROUTINE GENERAL MEDICAL EXAMINATION AT A HEALTH CARE FACILITY: Primary | ICD-10-CM

## 2018-10-19 DIAGNOSIS — Z86.010 HISTORY OF COLON POLYPS: ICD-10-CM

## 2018-10-19 LAB
ALBUMIN SERPL BCP-MCNC: 3.8 G/DL
ALP SERPL-CCNC: 64 U/L
ALT SERPL W/O P-5'-P-CCNC: 14 U/L
ANION GAP SERPL CALC-SCNC: 9 MMOL/L
AST SERPL-CCNC: 21 U/L
BASOPHILS # BLD AUTO: 0.03 K/UL
BASOPHILS NFR BLD: 0.5 %
BILIRUB SERPL-MCNC: 0.4 MG/DL
BUN SERPL-MCNC: 13 MG/DL
CALCIUM SERPL-MCNC: 9.8 MG/DL
CHLORIDE SERPL-SCNC: 108 MMOL/L
CHOLEST SERPL-MCNC: 162 MG/DL
CHOLEST/HDLC SERPL: 3.7 {RATIO}
CO2 SERPL-SCNC: 27 MMOL/L
COMPLEXED PSA SERPL-MCNC: 0.43 NG/ML
CREAT SERPL-MCNC: 1.3 MG/DL
DIFFERENTIAL METHOD: ABNORMAL
EOSINOPHIL # BLD AUTO: 0.1 K/UL
EOSINOPHIL NFR BLD: 2.4 %
ERYTHROCYTE [DISTWIDTH] IN BLOOD BY AUTOMATED COUNT: 12.6 %
EST. GFR  (AFRICAN AMERICAN): >60 ML/MIN/1.73 M^2
EST. GFR  (NON AFRICAN AMERICAN): >60 ML/MIN/1.73 M^2
GLUCOSE SERPL-MCNC: 80 MG/DL
HCT VFR BLD AUTO: 48.3 %
HDLC SERPL-MCNC: 44 MG/DL
HDLC SERPL: 27.2 %
HGB BLD-MCNC: 15.5 G/DL
IMM GRANULOCYTES # BLD AUTO: 0 K/UL
IMM GRANULOCYTES NFR BLD AUTO: 0 %
LDLC SERPL CALC-MCNC: 100 MG/DL
LYMPHOCYTES # BLD AUTO: 3.3 K/UL
LYMPHOCYTES NFR BLD: 57.5 %
MCH RBC QN AUTO: 30.2 PG
MCHC RBC AUTO-ENTMCNC: 32.1 G/DL
MCV RBC AUTO: 94 FL
MONOCYTES # BLD AUTO: 0.4 K/UL
MONOCYTES NFR BLD: 7.7 %
NEUTROPHILS # BLD AUTO: 1.8 K/UL
NEUTROPHILS NFR BLD: 31.9 %
NONHDLC SERPL-MCNC: 118 MG/DL
NRBC BLD-RTO: 0 /100 WBC
PLATELET # BLD AUTO: 162 K/UL
PMV BLD AUTO: 12.2 FL
POTASSIUM SERPL-SCNC: 4.7 MMOL/L
PROT SERPL-MCNC: 7.9 G/DL
RBC # BLD AUTO: 5.14 M/UL
SODIUM SERPL-SCNC: 144 MMOL/L
TRIGL SERPL-MCNC: 90 MG/DL
TSH SERPL DL<=0.005 MIU/L-ACNC: 0.78 UIU/ML
WBC # BLD AUTO: 5.72 K/UL

## 2018-10-19 PROCEDURE — 80053 COMPREHEN METABOLIC PANEL: CPT

## 2018-10-19 PROCEDURE — 99999 PR PBB SHADOW E&M-EST. PATIENT-LVL III: CPT | Mod: PBBFAC,,, | Performed by: INTERNAL MEDICINE

## 2018-10-19 PROCEDURE — 84443 ASSAY THYROID STIM HORMONE: CPT

## 2018-10-19 PROCEDURE — 36415 COLL VENOUS BLD VENIPUNCTURE: CPT | Mod: PO

## 2018-10-19 PROCEDURE — 85025 COMPLETE CBC W/AUTO DIFF WBC: CPT

## 2018-10-19 PROCEDURE — 84153 ASSAY OF PSA TOTAL: CPT

## 2018-10-19 PROCEDURE — 80061 LIPID PANEL: CPT

## 2018-10-19 PROCEDURE — 99396 PREV VISIT EST AGE 40-64: CPT | Mod: S$GLB,,, | Performed by: INTERNAL MEDICINE

## 2018-10-19 RX ORDER — MELOXICAM 15 MG/1
15 TABLET ORAL DAILY
Qty: 30 TABLET | Refills: 5 | Status: SHIPPED | OUTPATIENT
Start: 2018-10-19 | End: 2019-10-22

## 2018-10-19 RX ORDER — SILDENAFIL CITRATE 20 MG/1
TABLET ORAL
Qty: 60 TABLET | Refills: 5 | Status: SHIPPED | OUTPATIENT
Start: 2018-10-19 | End: 2019-04-26 | Stop reason: SDUPTHER

## 2018-10-19 NOTE — PROGRESS NOTES
"HPI:  Patient is a 53-year-old man who comes today for his yearly physical.  Patient is scheduled to see a orthopedic physician regarding his shoulder.  He otherwise has been doing well.  He has no other particular problems or complaints.      Current MEDS: medcard review, verified and update  Allergies: Per the electronic medical record    Past Medical History:   Diagnosis Date    History of colon polyps     History of hepatitis C     treated 2016 with complete remission       Past Surgical History:   Procedure Laterality Date    ANKLE SURGERY      COLONOSCOPY N/A 11/1/2017    Procedure: COLONOSCOPY;  Surgeon: Jose Alcocer MD;  Location: [facility] ENDO;  Service: Endoscopy;  Laterality: N/A;    COLONOSCOPY N/A 11/1/2017    Performed by Jose Alcocer MD at [facility] ENDO    injury      left eye       SHx: per the electronic medical record    FHx: recorded in the electronic medical record    ROS:    denies any chest pains or shortness of breath. Denies any nausea, vomiting or diarrhea. Denies any fever, chills or sweats. Denies any change in weight, voice, stool, skin or hair. Denies any dysuria, dyspepsia or dysphagia. Denies any change in vision, hearing or headaches. Denies any swollen lymph nodes or loss of memory.    PE:  /86 (BP Location: Right arm, Patient Position: Sitting, BP Method: Medium (Manual))   Pulse 75   Temp 98.3 °F (36.8 °C) (Tympanic)   Ht 5' 11" (1.803 m)   Wt 79.9 kg (176 lb 2.4 oz)   SpO2 95%   BMI 24.57 kg/m²   Gen: Well-developed, well-nourished, male, in no acute distress, oriented x3  HEENT: neck is supple, no adenopathy, carotids 2+ equal without bruits, thyroid exam normal size without nodules.  CHEST: clear to auscultation and percussion  CVS: regular rate and rhythm without significant murmur, gallop, or rubs  ABD: soft, benign, no rebound no guarding, no distention.  Bowel sounds are normal.     nontender.  No palpable masses.  No organomegaly and no audible bruits.  RECTAL: " no masses.  Prostate 30  Grams without nodules.  EXT: no clubbing, cyanosis, or edema  LYMPH: no cervical, inguinal, or axillary adenopathy  FEET: no loss of sensation.  No ulcers or pressure sores.  NEURO: gait normal.  Cranial nerves II- XII intact. No nystagmus.  Speech normal.   Gross motor and sensory unremarkable.        Impression:  Healthy 53-year-old male    Plan:   Orders Placed This Encounter    CBC auto differential    Comprehensive metabolic panel    Lipid panel    TSH    PSA, Screening    sildenafil (REVATIO) 20 mg Tab    meloxicam (MOBIC) 15 MG tablet     Patient will have the above lab work done today.  We will call him with results.  He will be seen on a yearly basis.

## 2018-10-30 ENCOUNTER — HOSPITAL ENCOUNTER (OUTPATIENT)
Dept: RADIOLOGY | Facility: HOSPITAL | Age: 53
Discharge: HOME | End: 2018-10-30
Attending: PHYSICIAN ASSISTANT
Payer: COMMERCIAL

## 2018-10-30 ENCOUNTER — OFFICE VISIT (OUTPATIENT)
Dept: ORTHOPEDICS | Facility: CLINIC | Age: 53
End: 2018-10-30
Payer: COMMERCIAL

## 2018-10-30 VITALS
DIASTOLIC BLOOD PRESSURE: 79 MMHG | SYSTOLIC BLOOD PRESSURE: 133 MMHG | HEART RATE: 53 BPM | BODY MASS INDEX: 24.66 KG/M2 | HEIGHT: 71 IN | WEIGHT: 176.13 LBS

## 2018-10-30 DIAGNOSIS — M25.511 RIGHT SHOULDER PAIN, UNSPECIFIED CHRONICITY: ICD-10-CM

## 2018-10-30 DIAGNOSIS — M25.511 RIGHT SHOULDER PAIN, UNSPECIFIED CHRONICITY: Primary | ICD-10-CM

## 2018-10-30 DIAGNOSIS — M75.41 IMPINGEMENT SYNDROME OF RIGHT SHOULDER: Primary | ICD-10-CM

## 2018-10-30 PROCEDURE — 73030 X-RAY EXAM OF SHOULDER: CPT | Mod: TC,FY,PO,RT

## 2018-10-30 PROCEDURE — 99204 OFFICE O/P NEW MOD 45 MIN: CPT | Mod: S$GLB,,, | Performed by: PHYSICIAN ASSISTANT

## 2018-10-30 PROCEDURE — 99999 PR PBB SHADOW E&M-EST. PATIENT-LVL III: CPT | Mod: PBBFAC,,, | Performed by: PHYSICIAN ASSISTANT

## 2018-10-30 PROCEDURE — 3008F BODY MASS INDEX DOCD: CPT | Mod: CPTII,S$GLB,, | Performed by: PHYSICIAN ASSISTANT

## 2018-10-30 PROCEDURE — 73030 X-RAY EXAM OF SHOULDER: CPT | Mod: 26,RT,, | Performed by: RADIOLOGY

## 2018-10-30 RX ORDER — MELOXICAM 7.5 MG/1
7.5 TABLET ORAL DAILY
Qty: 30 TABLET | Refills: 1 | Status: SHIPPED | OUTPATIENT
Start: 2018-10-30 | End: 2019-10-22

## 2018-10-30 NOTE — PATIENT INSTRUCTIONS
Shoulder Arthroscopy  The shoulder is your bodys most flexible joint. It lets the arm move in almost any direction. But this flexibility has a price -- it makes the joint prone to injury. If you have a shoulder problem, a surgical procedure called arthroscopy can help.     A camera in the arthroscope allows your surgeon to view your shoulder joint on a monitor.   Your orthopaedic evaluation  Your doctor will ask about your symptoms and the history of your shoulder problem. He or she will examine your shoulder and may give you tests, such as an X-ray or MRI. These help your doctor find the cause of your shoulder problem.  Arthroscopy: Looking inside your joint  Arthroscopy is a procedure that allows your doctor to see and work inside your shoulder joint. Your doctor makes small incision in your shoulder and inserts a long, thin, lighted instrument, called an arthroscope. During surgery, the arthroscope sends live video images from inside your joint to a screen that your doctor views. Using these images, your doctor can diagnose and treat your shoulder problem. Because arthroscopy uses much smaller incisions than open surgery, recovery is often shorter and less painful.  Risks and possible complications of shoulder arthroscopy  · Stiffness or ongoing pain in your shoulder  · Bleeding or blood clots  · Infection  · Damage to nerves or blood vessels  You may still need open surgery after having arthroscopy.  Date Last Reviewed: 9/10/2015  © 7657-5630 [employer]. [address] [place] [address], [place] [address]. All rights reserved. This information is not intended as a substitute for professional medical care. Always follow your healthcare professional's instructions.

## 2018-10-30 NOTE — LETTER
October 31, 2018      López Escalona MD  [address]  [address]  [place] [address]           [facility]  [address]  [place] [address]  Phone: [phone]  Fax: [phone]          Patient: Zane Allison   MR Number: [id number]   YOB: 1965   Date of Visit: 10/30/2018       Dear Dr. López Escalona:    Thank you for referring Zane Allison to me for evaluation. Attached you will find relevant portions of my assessment and plan of care.    If you have questions, please do not hesitate to call me. I look forward to following Zane Allison along with you.    Sincerely,    Jt Gant PA-C    Enclosure  CC:  No Recipients    If you would like to receive this communication electronically, please contact [email or web address] or [phone] to request more information on [employer] Link access.    For providers and/or their staff who would like to refer a patient to [facility], please contact us through our one-stop-shop provider referral line, [facility], at [phone].    If you feel you have received this communication in error or would no longer like to receive these types of communications, please e-mail [email or web address]
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
## 2018-11-02 ENCOUNTER — TELEPHONE (OUTPATIENT)
Dept: RADIOLOGY | Facility: HOSPITAL | Age: 53
End: 2018-11-02

## 2018-11-05 ENCOUNTER — HOSPITAL ENCOUNTER (OUTPATIENT)
Dept: RADIOLOGY | Facility: HOSPITAL | Age: 53
Discharge: HOME | End: 2018-11-05
Attending: PHYSICIAN ASSISTANT
Payer: COMMERCIAL

## 2018-11-05 DIAGNOSIS — M75.41 IMPINGEMENT SYNDROME OF RIGHT SHOULDER: ICD-10-CM

## 2018-11-05 PROCEDURE — 73221 MRI JOINT UPR EXTREM W/O DYE: CPT | Mod: TC,PO,RT

## 2018-11-05 PROCEDURE — 73221 MRI JOINT UPR EXTREM W/O DYE: CPT | Mod: 26,RT,, | Performed by: RADIOLOGY

## 2018-11-15 ENCOUNTER — OFFICE VISIT (OUTPATIENT)
Dept: ORTHOPEDICS | Facility: CLINIC | Age: 53
End: 2018-11-15
Payer: COMMERCIAL

## 2018-11-15 VITALS
SYSTOLIC BLOOD PRESSURE: 134 MMHG | RESPIRATION RATE: 19 BRPM | HEART RATE: 71 BPM | WEIGHT: 176 LBS | BODY MASS INDEX: 24.64 KG/M2 | DIASTOLIC BLOOD PRESSURE: 82 MMHG | HEIGHT: 71 IN

## 2018-11-15 DIAGNOSIS — M75.121 COMPLETE TEAR OF RIGHT ROTATOR CUFF: Primary | ICD-10-CM

## 2018-11-15 PROCEDURE — 3008F BODY MASS INDEX DOCD: CPT | Mod: CPTII,S$GLB,, | Performed by: PHYSICIAN ASSISTANT

## 2018-11-15 PROCEDURE — 99999 PR PBB SHADOW E&M-EST. PATIENT-LVL III: CPT | Mod: PBBFAC,,, | Performed by: PHYSICIAN ASSISTANT

## 2018-11-15 PROCEDURE — 99213 OFFICE O/P EST LOW 20 MIN: CPT | Mod: S$GLB,,, | Performed by: PHYSICIAN ASSISTANT

## 2018-11-15 NOTE — PATIENT INSTRUCTIONS
Rotator Cuff Tear  The rotator cuff is a group of muscles and tendons that surround the shoulder joint. These muscles and tendons hold the arm in its joint. They also help the shoulder to rotate. The rotator cuff can be torn from overuse or injury. Gradual wear and tear can lead to inflammation of these tendons. This can progress to gradual or sudden tears.  Symptoms of a torn rotator cuff include:  · Shoulder pain that gets worse when you raise your arm overhead  · Weakness of the shoulder muscles with overhead activity  · Popping and clicking when you move your shoulder  · Shoulder pain that wakes you up at night when sleeping on the hurt shoulder  Diagnosis is made by an MRI or arthroscopy. This is a surgical procedure to look inside the joint through a small tube. Partial rotator cuff tears can be treated by first resting, then strengthening the rotator cuff muscles.  Anti-inflammatory medicines, such as ibuprofen or naproxen, are useful. A limited number of steroid injections can be given. Surgery may be recommended for complete tears and partial tears that do not respond to medical treatment.  Home care  · Avoid activities that make your pain worse. This includes overhead activities, doing the same motion over and over, and heavy lifting.  · You may use over-the-counter pain medicines to control pain, unless another medicine was prescribed. If you have chronic liver or kidney disease or ever had a stomach ulcer or GI bleeding, talk with your healthcare provider before using these medicines.  · If you were given a sling, use it for comfort. After your pain decreases, dont keep your arm in the sling all the time. Take your arm out several times a day and move the shoulder joint, as you are able.  · Your healthcare provider may recommend gentle pendulum exercises. Stand or sit with your arm vertical and close to your side. Relax your shoulder muscles and gently swing the arm forward and back, side to side, and  in small circles for about 5 minutes. Do this once or twice a day. There should be only slight pain with this exercise.  · You may benefit from physical therapy or a home exercise program to strengthen your shoulder muscles. This will also increase your pain-free range of motion. Applying heat prior to exercises can help prepare the muscles and joint for activity. Talk to your healthcare provider about what is best for your condition.  Follow-up care  Follow up with your healthcare provider, or as advised.  When to seek medical advice  Call your healthcare provider right away if any of the following occur:  · Increasing shoulder pain  · Rapid swelling in the involved shoulder or arm  · Numbness, tingling, or pain radiating down the arm to the hand  · Loss of strength in the affected arm  Date Last Reviewed: 11/23/2015  © 6186-7506 [employer]. [address] [place] [address], [place] [address]. All rights reserved. This information is not intended as a substitute for professional medical care. Always follow your healthcare professional's instructions.
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
## 2019-04-26 ENCOUNTER — OFFICE VISIT (OUTPATIENT)
Dept: INTERNAL MEDICINE | Facility: CLINIC | Age: 54
End: 2019-04-26
Payer: COMMERCIAL

## 2019-04-26 ENCOUNTER — LAB VISIT (OUTPATIENT)
Dept: LAB | Facility: HOSPITAL | Age: 54
End: 2019-04-26
Attending: INTERNAL MEDICINE
Payer: COMMERCIAL

## 2019-04-26 VITALS
OXYGEN SATURATION: 97 % | HEART RATE: 97 BPM | TEMPERATURE: 97 F | WEIGHT: 180.75 LBS | DIASTOLIC BLOOD PRESSURE: 78 MMHG | SYSTOLIC BLOOD PRESSURE: 132 MMHG | HEIGHT: 71 IN | BODY MASS INDEX: 25.31 KG/M2

## 2019-04-26 DIAGNOSIS — R10.9 ABDOMINAL PAIN, UNSPECIFIED ABDOMINAL LOCATION: ICD-10-CM

## 2019-04-26 DIAGNOSIS — R10.9 ABDOMINAL PAIN, UNSPECIFIED ABDOMINAL LOCATION: Primary | ICD-10-CM

## 2019-04-26 LAB
ALBUMIN SERPL BCP-MCNC: 3.8 G/DL (ref 3.5–5.2)
ALP SERPL-CCNC: 63 U/L (ref 55–135)
ALT SERPL W/O P-5'-P-CCNC: 18 U/L (ref 10–44)
ANION GAP SERPL CALC-SCNC: 11 MMOL/L (ref 8–16)
AST SERPL-CCNC: 18 U/L (ref 10–40)
BASOPHILS # BLD AUTO: 0.03 K/UL (ref 0–0.2)
BASOPHILS NFR BLD: 0.5 % (ref 0–1.9)
BILIRUB SERPL-MCNC: 0.3 MG/DL (ref 0.1–1)
BUN SERPL-MCNC: 14 MG/DL (ref 6–20)
CALCIUM SERPL-MCNC: 9.8 MG/DL (ref 8.7–10.5)
CHLORIDE SERPL-SCNC: 105 MMOL/L (ref 95–110)
CO2 SERPL-SCNC: 27 MMOL/L (ref 23–29)
CREAT SERPL-MCNC: 1.3 MG/DL (ref 0.5–1.4)
DIFFERENTIAL METHOD: ABNORMAL
EOSINOPHIL # BLD AUTO: 0.1 K/UL (ref 0–0.5)
EOSINOPHIL NFR BLD: 2.5 % (ref 0–8)
ERYTHROCYTE [DISTWIDTH] IN BLOOD BY AUTOMATED COUNT: 12.1 % (ref 11.5–14.5)
EST. GFR  (AFRICAN AMERICAN): >60 ML/MIN/1.73 M^2
EST. GFR  (NON AFRICAN AMERICAN): >60 ML/MIN/1.73 M^2
GLUCOSE SERPL-MCNC: 89 MG/DL (ref 70–110)
HCT VFR BLD AUTO: 46.2 % (ref 40–54)
HGB BLD-MCNC: 14.9 G/DL (ref 14–18)
IMM GRANULOCYTES # BLD AUTO: 0.01 K/UL (ref 0–0.04)
IMM GRANULOCYTES NFR BLD AUTO: 0.2 % (ref 0–0.5)
LYMPHOCYTES # BLD AUTO: 2.8 K/UL (ref 1–4.8)
LYMPHOCYTES NFR BLD: 50.2 % (ref 18–48)
MCH RBC QN AUTO: 30.2 PG (ref 27–31)
MCHC RBC AUTO-ENTMCNC: 32.3 G/DL (ref 32–36)
MCV RBC AUTO: 94 FL (ref 82–98)
MONOCYTES # BLD AUTO: 0.4 K/UL (ref 0.3–1)
MONOCYTES NFR BLD: 7.8 % (ref 4–15)
NEUTROPHILS # BLD AUTO: 2.1 K/UL (ref 1.8–7.7)
NEUTROPHILS NFR BLD: 38.8 % (ref 38–73)
NRBC BLD-RTO: 0 /100 WBC
PLATELET # BLD AUTO: 165 K/UL (ref 150–350)
PMV BLD AUTO: 12.5 FL (ref 9.2–12.9)
POTASSIUM SERPL-SCNC: 4.2 MMOL/L (ref 3.5–5.1)
PROT SERPL-MCNC: 7.8 G/DL (ref 6–8.4)
RBC # BLD AUTO: 4.94 M/UL (ref 4.6–6.2)
SODIUM SERPL-SCNC: 143 MMOL/L (ref 136–145)
WBC # BLD AUTO: 5.52 K/UL (ref 3.9–12.7)

## 2019-04-26 PROCEDURE — 85025 COMPLETE CBC W/AUTO DIFF WBC: CPT

## 2019-04-26 PROCEDURE — 36415 COLL VENOUS BLD VENIPUNCTURE: CPT | Mod: PO

## 2019-04-26 PROCEDURE — 3008F PR BODY MASS INDEX (BMI) DOCUMENTED: ICD-10-PCS | Mod: CPTII,S$GLB,, | Performed by: INTERNAL MEDICINE

## 2019-04-26 PROCEDURE — 99213 PR OFFICE/OUTPT VISIT, EST, LEVL III, 20-29 MIN: ICD-10-PCS | Mod: S$GLB,,, | Performed by: INTERNAL MEDICINE

## 2019-04-26 PROCEDURE — 3008F BODY MASS INDEX DOCD: CPT | Mod: CPTII,S$GLB,, | Performed by: INTERNAL MEDICINE

## 2019-04-26 PROCEDURE — 80053 COMPREHEN METABOLIC PANEL: CPT

## 2019-04-26 PROCEDURE — 99999 PR PBB SHADOW E&M-EST. PATIENT-LVL III: ICD-10-PCS | Mod: PBBFAC,,, | Performed by: INTERNAL MEDICINE

## 2019-04-26 PROCEDURE — 99999 PR PBB SHADOW E&M-EST. PATIENT-LVL III: CPT | Mod: PBBFAC,,, | Performed by: INTERNAL MEDICINE

## 2019-04-26 PROCEDURE — 99213 OFFICE O/P EST LOW 20 MIN: CPT | Mod: S$GLB,,, | Performed by: INTERNAL MEDICINE

## 2019-04-26 RX ORDER — SILDENAFIL CITRATE 20 MG/1
TABLET ORAL
Qty: 60 TABLET | Refills: 5 | Status: SHIPPED | OUTPATIENT
Start: 2019-04-26 | End: 2021-11-05

## 2019-05-22 ENCOUNTER — HOSPITAL ENCOUNTER (OUTPATIENT)
Dept: RADIOLOGY | Facility: HOSPITAL | Age: 54
Discharge: HOME | End: 2019-05-22
Attending: INTERNAL MEDICINE
Payer: COMMERCIAL

## 2019-05-22 DIAGNOSIS — R10.9 ABDOMINAL PAIN, UNSPECIFIED ABDOMINAL LOCATION: ICD-10-CM

## 2019-05-22 PROCEDURE — 76700 US EXAM ABDOM COMPLETE: CPT | Mod: TC

## 2019-05-22 PROCEDURE — 76700 US ABDOMEN COMPLETE: ICD-10-PCS | Mod: 26,,, | Performed by: RADIOLOGY

## 2019-05-22 PROCEDURE — 76700 US EXAM ABDOM COMPLETE: CPT | Mod: 26,,, | Performed by: RADIOLOGY

## 2019-06-04 RX ORDER — ZOLPIDEM TARTRATE 5 MG/1
TABLET ORAL
Qty: 10 TABLET | Refills: 5 | Status: SHIPPED | OUTPATIENT
Start: 2019-06-04 | End: 2019-11-22 | Stop reason: SDUPTHER

## 2019-07-10 ENCOUNTER — OFFICE VISIT (OUTPATIENT)
Dept: INTERNAL MEDICINE | Facility: CLINIC | Age: 54
End: 2019-07-10
Payer: COMMERCIAL

## 2019-07-10 VITALS
BODY MASS INDEX: 25.86 KG/M2 | OXYGEN SATURATION: 97 % | SYSTOLIC BLOOD PRESSURE: 132 MMHG | TEMPERATURE: 98 F | DIASTOLIC BLOOD PRESSURE: 90 MMHG | HEART RATE: 65 BPM | WEIGHT: 185.44 LBS

## 2019-07-10 DIAGNOSIS — H61.22 IMPACTED CERUMEN OF LEFT EAR: ICD-10-CM

## 2019-07-10 DIAGNOSIS — B00.9 HSV-2 INFECTION: Primary | ICD-10-CM

## 2019-07-10 PROCEDURE — 99999 PR PBB SHADOW E&M-EST. PATIENT-LVL III: ICD-10-PCS | Mod: PBBFAC,,, | Performed by: INTERNAL MEDICINE

## 2019-07-10 PROCEDURE — 3008F PR BODY MASS INDEX (BMI) DOCUMENTED: ICD-10-PCS | Mod: CPTII,S$GLB,, | Performed by: INTERNAL MEDICINE

## 2019-07-10 PROCEDURE — 99213 PR OFFICE/OUTPT VISIT, EST, LEVL III, 20-29 MIN: ICD-10-PCS | Mod: S$GLB,,, | Performed by: INTERNAL MEDICINE

## 2019-07-10 PROCEDURE — 3008F BODY MASS INDEX DOCD: CPT | Mod: CPTII,S$GLB,, | Performed by: INTERNAL MEDICINE

## 2019-07-10 PROCEDURE — 99999 PR PBB SHADOW E&M-EST. PATIENT-LVL III: CPT | Mod: PBBFAC,,, | Performed by: INTERNAL MEDICINE

## 2019-07-10 PROCEDURE — 99213 OFFICE O/P EST LOW 20 MIN: CPT | Mod: S$GLB,,, | Performed by: INTERNAL MEDICINE

## 2019-07-10 RX ORDER — VALACYCLOVIR HYDROCHLORIDE 500 MG/1
500 TABLET, FILM COATED ORAL 3 TIMES DAILY
Qty: 15 TABLET | Refills: 3 | Status: SHIPPED | OUTPATIENT
Start: 2019-07-10 | End: 2019-11-15 | Stop reason: SDUPTHER

## 2019-07-10 NOTE — PROGRESS NOTES
HPI:  Patient is a 53-year-old man who comes today with complaints of left ear being stuffy and feels like something in there.  He states has been present for several days.  He is also complaining of a lesion on the foreskin that began over the weekend.  He has a history of herpes simplex many decades ago.    Current meds have been verified and updated per the EMR  Exam:BP (!) 132/90   Pulse 65   Temp 98.1 °F (36.7 °C)   Wt 84.1 kg (185 lb 6.5 oz)   SpO2 97%   BMI 25.86 kg/m²   Right TM unremarkable, left TM normal except a left external auditory canal is about 60-70% occluded with wax  Pulling back the foreskin of his penis reveals he has has a resolving herpes simplex outbreak.  Lab Results   Component Value Date    WBC 5.52 04/26/2019    HGB 14.9 04/26/2019    HCT 46.2 04/26/2019     04/26/2019    CHOL 162 10/19/2018    TRIG 90 10/19/2018    HDL 44 10/19/2018    ALT 18 04/26/2019    AST 18 04/26/2019     04/26/2019    K 4.2 04/26/2019     04/26/2019    CREATININE 1.3 04/26/2019    BUN 14 04/26/2019    CO2 27 04/26/2019    TSH 0.784 10/19/2018    PSA 0.43 10/19/2018       Impression:  Recurrent HSV  Cerumen impaction  Patient Active Problem List   Diagnosis    History of colon polyps       Plan:  Orders Placed This Encounter    valACYclovir (VALTREX) 500 MG tablet    He was given Valtrex to take 500 mg 3 times a day for 5 days.  He was given refills.  Patient was instructed how to clean out the ear wax.      This note is generated with speech recognition software and is subject to transcription error and sound alike phrases that may be missed by proofreading.

## 2019-10-22 ENCOUNTER — TELEPHONE (OUTPATIENT)
Dept: ORTHOPEDICS | Facility: CLINIC | Age: 54
End: 2019-10-22

## 2019-10-22 ENCOUNTER — HOSPITAL ENCOUNTER (OUTPATIENT)
Dept: RADIOLOGY | Facility: HOSPITAL | Age: 54
Discharge: HOME | End: 2019-10-22
Attending: PHYSICIAN ASSISTANT
Payer: COMMERCIAL

## 2019-10-22 ENCOUNTER — OFFICE VISIT (OUTPATIENT)
Dept: SPORTS MEDICINE | Facility: CLINIC | Age: 54
End: 2019-10-22
Payer: COMMERCIAL

## 2019-10-22 VITALS
SYSTOLIC BLOOD PRESSURE: 135 MMHG | DIASTOLIC BLOOD PRESSURE: 89 MMHG | WEIGHT: 185 LBS | HEART RATE: 55 BPM | BODY MASS INDEX: 25.9 KG/M2 | HEIGHT: 71 IN | RESPIRATION RATE: 18 BRPM

## 2019-10-22 DIAGNOSIS — M75.22 TENDONITIS OF UPPER BICEPS TENDON OF LEFT SHOULDER: ICD-10-CM

## 2019-10-22 DIAGNOSIS — M25.512 LEFT SHOULDER PAIN, UNSPECIFIED CHRONICITY: Primary | ICD-10-CM

## 2019-10-22 DIAGNOSIS — M25.512 LEFT SHOULDER PAIN, UNSPECIFIED CHRONICITY: ICD-10-CM

## 2019-10-22 DIAGNOSIS — G89.29 CHRONIC LEFT SHOULDER PAIN: Primary | ICD-10-CM

## 2019-10-22 DIAGNOSIS — M25.512 CHRONIC LEFT SHOULDER PAIN: Primary | ICD-10-CM

## 2019-10-22 DIAGNOSIS — M75.42 IMPINGEMENT SYNDROME OF LEFT SHOULDER: ICD-10-CM

## 2019-10-22 DIAGNOSIS — M75.82 ROTATOR CUFF TENDINITIS, LEFT: ICD-10-CM

## 2019-10-22 PROCEDURE — 20610 PR DRAIN/INJECT LARGE JOINT/BURSA: ICD-10-PCS | Mod: LT,S$GLB,, | Performed by: PHYSICIAN ASSISTANT

## 2019-10-22 PROCEDURE — 99999 PR PBB SHADOW E&M-EST. PATIENT-LVL III: CPT | Mod: PBBFAC,,, | Performed by: PHYSICIAN ASSISTANT

## 2019-10-22 PROCEDURE — 99214 OFFICE O/P EST MOD 30 MIN: CPT | Mod: 25,S$GLB,, | Performed by: PHYSICIAN ASSISTANT

## 2019-10-22 PROCEDURE — 73030 X-RAY EXAM OF SHOULDER: CPT | Mod: TC,LT

## 2019-10-22 PROCEDURE — 20610 DRAIN/INJ JOINT/BURSA W/O US: CPT | Mod: LT,S$GLB,, | Performed by: PHYSICIAN ASSISTANT

## 2019-10-22 PROCEDURE — 99214 PR OFFICE/OUTPT VISIT, EST, LEVL IV, 30-39 MIN: ICD-10-PCS | Mod: 25,S$GLB,, | Performed by: PHYSICIAN ASSISTANT

## 2019-10-22 PROCEDURE — 73030 X-RAY EXAM OF SHOULDER: CPT | Mod: 26,LT,, | Performed by: RADIOLOGY

## 2019-10-22 PROCEDURE — 99999 PR PBB SHADOW E&M-EST. PATIENT-LVL III: ICD-10-PCS | Mod: PBBFAC,,, | Performed by: PHYSICIAN ASSISTANT

## 2019-10-22 PROCEDURE — 3008F BODY MASS INDEX DOCD: CPT | Mod: CPTII,S$GLB,, | Performed by: PHYSICIAN ASSISTANT

## 2019-10-22 PROCEDURE — 73030 XR SHOULDER COMPLETE 2 OR MORE VIEWS LEFT: ICD-10-PCS | Mod: 26,LT,, | Performed by: RADIOLOGY

## 2019-10-22 PROCEDURE — 3008F PR BODY MASS INDEX (BMI) DOCUMENTED: ICD-10-PCS | Mod: CPTII,S$GLB,, | Performed by: PHYSICIAN ASSISTANT

## 2019-10-22 RX ORDER — METHYLPREDNISOLONE ACETATE 80 MG/ML
80 INJECTION, SUSPENSION INTRA-ARTICULAR; INTRALESIONAL; INTRAMUSCULAR; SOFT TISSUE ONCE
Status: COMPLETED | OUTPATIENT
Start: 2019-10-22 | End: 2019-10-22

## 2019-10-22 RX ORDER — MELOXICAM 15 MG/1
15 TABLET ORAL DAILY
Qty: 30 TABLET | Refills: 0 | Status: SHIPPED | OUTPATIENT
Start: 2019-10-22 | End: 2021-11-05

## 2019-10-22 RX ADMIN — METHYLPREDNISOLONE ACETATE 80 MG: 80 INJECTION, SUSPENSION INTRA-ARTICULAR; INTRALESIONAL; INTRAMUSCULAR; SOFT TISSUE at 10:10

## 2019-10-22 NOTE — TELEPHONE ENCOUNTER
Pt was incorrectly scheduled with Dr. Victoria for Left shoulder pain. Called pt and explained that Dr. Victoria is a hand specialist, and that he would need to be seen by one of our other physicians. He agreed, and was happy to go to the location at [facility] due to being closer to home. Explained that he would be seeing Ms. Prerna Iglesias PA-C and he needed to arrive 20-30 minutes early for X-Rays. He verbalized understanding and was thankful for the call. Explained how to get to this location multiple times before getting off the phone with pt and he understood, AL, LPN.
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
## 2019-11-05 ENCOUNTER — OFFICE VISIT (OUTPATIENT)
Dept: SPORTS MEDICINE | Facility: CLINIC | Age: 54
End: 2019-11-05
Payer: COMMERCIAL

## 2019-11-05 VITALS
HEART RATE: 65 BPM | WEIGHT: 185 LBS | BODY MASS INDEX: 25.9 KG/M2 | SYSTOLIC BLOOD PRESSURE: 131 MMHG | DIASTOLIC BLOOD PRESSURE: 83 MMHG | HEIGHT: 71 IN

## 2019-11-05 DIAGNOSIS — M25.512 CHRONIC LEFT SHOULDER PAIN: Primary | ICD-10-CM

## 2019-11-05 DIAGNOSIS — M75.22 TENDONITIS OF UPPER BICEPS TENDON OF LEFT SHOULDER: ICD-10-CM

## 2019-11-05 DIAGNOSIS — G89.29 CHRONIC LEFT SHOULDER PAIN: Primary | ICD-10-CM

## 2019-11-05 DIAGNOSIS — M75.42 IMPINGEMENT SYNDROME OF LEFT SHOULDER: ICD-10-CM

## 2019-11-05 DIAGNOSIS — M75.82 ROTATOR CUFF TENDINITIS, LEFT: ICD-10-CM

## 2019-11-05 PROCEDURE — 99999 PR PBB SHADOW E&M-EST. PATIENT-LVL III: CPT | Mod: PBBFAC,,, | Performed by: PHYSICIAN ASSISTANT

## 2019-11-05 PROCEDURE — 99214 OFFICE O/P EST MOD 30 MIN: CPT | Mod: S$GLB,,, | Performed by: PHYSICIAN ASSISTANT

## 2019-11-05 PROCEDURE — 3008F BODY MASS INDEX DOCD: CPT | Mod: CPTII,S$GLB,, | Performed by: PHYSICIAN ASSISTANT

## 2019-11-05 PROCEDURE — 99999 PR PBB SHADOW E&M-EST. PATIENT-LVL III: ICD-10-PCS | Mod: PBBFAC,,, | Performed by: PHYSICIAN ASSISTANT

## 2019-11-05 PROCEDURE — 99214 PR OFFICE/OUTPT VISIT, EST, LEVL IV, 30-39 MIN: ICD-10-PCS | Mod: S$GLB,,, | Performed by: PHYSICIAN ASSISTANT

## 2019-11-05 PROCEDURE — 3008F PR BODY MASS INDEX (BMI) DOCUMENTED: ICD-10-PCS | Mod: CPTII,S$GLB,, | Performed by: PHYSICIAN ASSISTANT
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
## 2019-11-05 NOTE — Clinical Note
Deirdre Benjamin is the arturo you were planning to work in for me for chest pain evaluation. He did not keep the appointment with you.  When he tried to check in, he was told that he could not check in 4:40 appointment before lunch.  Just wanted to make you aware of that.  Pain has since resolved.  I have advised him to follow up with his PCP for routine checkup.

## 2019-11-14 ENCOUNTER — TELEPHONE (OUTPATIENT)
Dept: INTERNAL MEDICINE | Facility: CLINIC | Age: 54
End: 2019-11-14

## 2019-11-14 DIAGNOSIS — Z00.00 ROUTINE GENERAL MEDICAL EXAMINATION AT A HEALTH CARE FACILITY: Primary | ICD-10-CM

## 2019-11-14 NOTE — TELEPHONE ENCOUNTER
----- Message from Rosa Jay sent at 11/14/2019 11:42 AM CST -----  Contact: pt   Pt states that he need orders in for annual labs.   .[phone] (home)

## 2019-11-15 ENCOUNTER — LAB VISIT (OUTPATIENT)
Dept: LAB | Facility: HOSPITAL | Age: 54
End: 2019-11-15
Attending: INTERNAL MEDICINE
Payer: COMMERCIAL

## 2019-11-15 DIAGNOSIS — Z00.00 ROUTINE GENERAL MEDICAL EXAMINATION AT A HEALTH CARE FACILITY: ICD-10-CM

## 2019-11-15 PROCEDURE — 80061 LIPID PANEL: CPT

## 2019-11-15 PROCEDURE — 84153 ASSAY OF PSA TOTAL: CPT

## 2019-11-15 PROCEDURE — 36415 COLL VENOUS BLD VENIPUNCTURE: CPT | Mod: PO

## 2019-11-15 PROCEDURE — 85025 COMPLETE CBC W/AUTO DIFF WBC: CPT

## 2019-11-15 PROCEDURE — 84439 ASSAY OF FREE THYROXINE: CPT

## 2019-11-15 PROCEDURE — 80053 COMPREHEN METABOLIC PANEL: CPT

## 2019-11-15 PROCEDURE — 84443 ASSAY THYROID STIM HORMONE: CPT

## 2019-11-15 RX ORDER — VALACYCLOVIR HYDROCHLORIDE 500 MG/1
500 TABLET, FILM COATED ORAL 3 TIMES DAILY
Qty: 15 TABLET | Refills: 3 | Status: SHIPPED | OUTPATIENT
Start: 2019-11-15 | End: 2019-11-22 | Stop reason: SDUPTHER

## 2019-11-15 NOTE — TELEPHONE ENCOUNTER
----- Message from John Le sent at 11/15/2019  1:25 PM CST -----  Contact: self  .Type:  RX Refill Request    Who Called: Zane Allison  Refill or New Rx:refill  RX Name and Strength:valACYclovir (  How is the patient currently taking it? (ex. 1XDay):Daily  Is this a 30 day or 90 day RX:30  Preferred Pharmacy with phone number:.  [facility] - [place], [place] - [address] [facility]  [address] [facility] [address]  Phone: [phone] Fax: [phone]    [address] - [place], [place] - [address] [facility]  [address] [facility] [address]  Phone: [phone] Fax: [phone]    [id number] - [place], [place] - [address] AT [facility] [address]  [address]  [address]  [place] [address]  Phone: [phone] Fax: [phone]      Local or Mail Order:local  Ordering Provider:Dr. Escalona  Would the patient rather a call back or a response via [facility]? Call [facility]  Best Call Back Number:[phone]  Additional Information:

## 2019-11-16 LAB
ALBUMIN SERPL BCP-MCNC: 4.1 G/DL (ref 3.5–5.2)
ALP SERPL-CCNC: 58 U/L (ref 55–135)
ALT SERPL W/O P-5'-P-CCNC: 20 U/L (ref 10–44)
ANION GAP SERPL CALC-SCNC: 10 MMOL/L (ref 8–16)
AST SERPL-CCNC: 22 U/L (ref 10–40)
BASOPHILS # BLD AUTO: 0.02 K/UL (ref 0–0.2)
BASOPHILS NFR BLD: 0.2 % (ref 0–1.9)
BILIRUB SERPL-MCNC: 0.5 MG/DL (ref 0.1–1)
BUN SERPL-MCNC: 16 MG/DL (ref 6–20)
CALCIUM SERPL-MCNC: 10.1 MG/DL (ref 8.7–10.5)
CHLORIDE SERPL-SCNC: 103 MMOL/L (ref 95–110)
CHOLEST SERPL-MCNC: 210 MG/DL (ref 120–199)
CHOLEST/HDLC SERPL: 3.9 {RATIO} (ref 2–5)
CO2 SERPL-SCNC: 28 MMOL/L (ref 23–29)
COMPLEXED PSA SERPL-MCNC: 0.47 NG/ML (ref 0–4)
CREAT SERPL-MCNC: 1.2 MG/DL (ref 0.5–1.4)
DIFFERENTIAL METHOD: ABNORMAL
EOSINOPHIL # BLD AUTO: 0 K/UL (ref 0–0.5)
EOSINOPHIL NFR BLD: 0.2 % (ref 0–8)
ERYTHROCYTE [DISTWIDTH] IN BLOOD BY AUTOMATED COUNT: 12.3 % (ref 11.5–14.5)
EST. GFR  (AFRICAN AMERICAN): >60 ML/MIN/1.73 M^2
EST. GFR  (NON AFRICAN AMERICAN): >60 ML/MIN/1.73 M^2
GLUCOSE SERPL-MCNC: 94 MG/DL (ref 70–110)
HCT VFR BLD AUTO: 47.8 % (ref 40–54)
HDLC SERPL-MCNC: 54 MG/DL (ref 40–75)
HDLC SERPL: 25.7 % (ref 20–50)
HGB BLD-MCNC: 15.5 G/DL (ref 14–18)
IMM GRANULOCYTES # BLD AUTO: 0.03 K/UL (ref 0–0.04)
IMM GRANULOCYTES NFR BLD AUTO: 0.3 % (ref 0–0.5)
LDLC SERPL CALC-MCNC: 137.8 MG/DL (ref 63–159)
LYMPHOCYTES # BLD AUTO: 3 K/UL (ref 1–4.8)
LYMPHOCYTES NFR BLD: 32.1 % (ref 18–48)
MCH RBC QN AUTO: 31.1 PG (ref 27–31)
MCHC RBC AUTO-ENTMCNC: 32.4 G/DL (ref 32–36)
MCV RBC AUTO: 96 FL (ref 82–98)
MONOCYTES # BLD AUTO: 0.5 K/UL (ref 0.3–1)
MONOCYTES NFR BLD: 5.2 % (ref 4–15)
NEUTROPHILS # BLD AUTO: 5.8 K/UL (ref 1.8–7.7)
NEUTROPHILS NFR BLD: 62 % (ref 38–73)
NONHDLC SERPL-MCNC: 156 MG/DL
NRBC BLD-RTO: 0 /100 WBC
PLATELET # BLD AUTO: 166 K/UL (ref 150–350)
PMV BLD AUTO: 11.9 FL (ref 9.2–12.9)
POTASSIUM SERPL-SCNC: 4.3 MMOL/L (ref 3.5–5.1)
PROT SERPL-MCNC: 8.3 G/DL (ref 6–8.4)
RBC # BLD AUTO: 4.98 M/UL (ref 4.6–6.2)
SODIUM SERPL-SCNC: 141 MMOL/L (ref 136–145)
T4 FREE SERPL-MCNC: 0.98 NG/DL (ref 0.71–1.51)
TRIGL SERPL-MCNC: 91 MG/DL (ref 30–150)
TSH SERPL DL<=0.005 MIU/L-ACNC: 0.26 UIU/ML (ref 0.4–4)
WBC # BLD AUTO: 9.28 K/UL (ref 3.9–12.7)

## 2019-11-22 ENCOUNTER — OFFICE VISIT (OUTPATIENT)
Dept: INTERNAL MEDICINE | Facility: CLINIC | Age: 54
End: 2019-11-22
Payer: COMMERCIAL

## 2019-11-22 VITALS
BODY MASS INDEX: 25.77 KG/M2 | HEART RATE: 73 BPM | WEIGHT: 184.06 LBS | HEIGHT: 71 IN | SYSTOLIC BLOOD PRESSURE: 138 MMHG | DIASTOLIC BLOOD PRESSURE: 82 MMHG | OXYGEN SATURATION: 96 % | TEMPERATURE: 98 F

## 2019-11-22 DIAGNOSIS — F41.9 ANXIETY: ICD-10-CM

## 2019-11-22 DIAGNOSIS — Z86.010 HISTORY OF COLON POLYPS: ICD-10-CM

## 2019-11-22 DIAGNOSIS — Z00.00 ROUTINE GENERAL MEDICAL EXAMINATION AT A HEALTH CARE FACILITY: Primary | ICD-10-CM

## 2019-11-22 PROCEDURE — 99396 PREV VISIT EST AGE 40-64: CPT | Mod: S$GLB,,, | Performed by: INTERNAL MEDICINE

## 2019-11-22 PROCEDURE — 99999 PR PBB SHADOW E&M-EST. PATIENT-LVL IV: CPT | Mod: PBBFAC,,, | Performed by: INTERNAL MEDICINE

## 2019-11-22 PROCEDURE — 99999 PR PBB SHADOW E&M-EST. PATIENT-LVL IV: ICD-10-PCS | Mod: PBBFAC,,, | Performed by: INTERNAL MEDICINE

## 2019-11-22 PROCEDURE — 99396 PR PREVENTIVE VISIT,EST,40-64: ICD-10-PCS | Mod: S$GLB,,, | Performed by: INTERNAL MEDICINE

## 2019-11-22 RX ORDER — VALACYCLOVIR HYDROCHLORIDE 500 MG/1
500 TABLET, FILM COATED ORAL 3 TIMES DAILY
Qty: 15 TABLET | Refills: 3 | Status: SHIPPED | OUTPATIENT
Start: 2019-11-22 | End: 2021-03-26 | Stop reason: SDUPTHER

## 2019-11-22 RX ORDER — ZOLPIDEM TARTRATE 5 MG/1
TABLET ORAL
Qty: 10 TABLET | Refills: 5 | Status: SHIPPED | OUTPATIENT
Start: 2019-11-22 | End: 2021-11-05

## 2019-11-22 NOTE — PROGRESS NOTES
"HPI:  Patient is a 54-year-old man who comes today for his annual physical.  At this time his primarily concern is that he seems to be more absent minus that he has been in the past.  He does admit he has been under lot of stress and he gets very anxious at times.  He also tends to have sharp stabbing pain in his left upper chest that last for weeks at a time.  It never interferes with his ability to work.  Is never associated with exertion.      Current MEDS: medcard review, verified and update  Allergies: Per the electronic medical record    Past Medical History:   Diagnosis Date    History of colon polyps     History of hepatitis C     treated 2016 with complete remission       Past Surgical History:   Procedure Laterality Date    ANKLE SURGERY      COLONOSCOPY N/A 11/1/2017    Procedure: COLONOSCOPY;  Surgeon: Jose Alcocer MD;  Location[facility];  Service: Endoscopy;  Laterality: N/A;    injury      left eye       SHx: per the electronic medical record    FHx: recorded in the electronic medical record    ROS:    denies any chest pains or shortness of breath. Denies any nausea, vomiting or diarrhea. Denies any fever, chills or sweats. Denies any change in weight, voice, stool, skin or hair. Denies any dysuria, dyspepsia or dysphagia. Denies any change in vision, hearing or headaches. Denies any swollen lymph nodes or loss of memory.    PE:  /82   Pulse 73   Temp 97.9 °F (36.6 °C) (Tympanic)   Ht 5' 11" (1.803 m)   Wt 83.5 kg (184 lb 1.4 oz)   SpO2 96%   BMI 25.67 kg/m²   Gen: Well-developed, well-nourished, male, in no acute distress, oriented x3  HEENT: neck is supple, no adenopathy, carotids 2+ equal without bruits, thyroid exam normal size without nodules.  CHEST: clear to auscultation and percussion  CVS: regular rate and rhythm without significant murmur, gallop, or rubs  ABD: soft, benign, no rebound no guarding, no distention.  Bowel sounds are normal.     nontender.  No palpable " masses.  No organomegaly and no audible bruits.  RECTAL: no masses.  Prostate  30 Grams without nodules.  EXT: no clubbing, cyanosis, or edema  LYMPH: no cervical, inguinal, or axillary adenopathy  FEET: no loss of sensation.  No ulcers or pressure sores.  NEURO: gait normal.  Cranial nerves II- XII intact. No nystagmus.  Speech normal.   Gross motor and sensory unremarkable.    Lab Results   Component Value Date    WBC 9.28 11/15/2019    HGB 15.5 11/15/2019    HCT 47.8 11/15/2019     11/15/2019    CHOL 210 (H) 11/15/2019    TRIG 91 11/15/2019    HDL 54 11/15/2019    ALT 20 11/15/2019    AST 22 11/15/2019     11/15/2019    K 4.3 11/15/2019     11/15/2019    CREATININE 1.2 11/15/2019    BUN 16 11/15/2019    CO2 28 11/15/2019    TSH 0.259 (L) 11/15/2019    PSA 0.47 11/15/2019       Impression:  General anxiety disorder  Patient Active Problem List   Diagnosis    History of colon polyps       Plan:   Orders Placed This Encounter    Ambulatory consult to Psychiatry    valACYclovir (VALTREX) 500 MG tablet    zolpidem (AMBIEN) 5 MG Tab    Patient was referred to see Psychiatry.  I really think he needs some counseling.  He will otherwise see me in 1 year.    This note is generated with speech recognition software and is subject to transcription error and sound alike phrases that may be missed by proofreading.

## 2019-12-05 ENCOUNTER — TELEPHONE (OUTPATIENT)
Dept: PSYCHIATRY | Facility: CLINIC | Age: 54
End: 2019-12-05

## 2020-07-15 NOTE — TELEPHONE ENCOUNTER
----- Message from Wayne Dorantes sent at 7/18/2018 10:14 AM CDT -----  Contact: Pt   ..1. What is the name of the medication you are requesting? Cialis   2. What is the dose?   3. How do you take the medication? Orally, topically, etc? Orally   4. How often do you take this medication? As needed   5. Do you need a 30 day or 90 day supply? 30 day   6. How many refills are you requesting?  7. What is your preferred pharmacy and location of the pharmacy?     ..  [facility] [address] [place] [address]  Phone: [phone] Fax: [phone]      8. Who can we contact with further questions? Pt a [phone]     negative

## 2020-12-03 ENCOUNTER — HOSPITAL ENCOUNTER (EMERGENCY)
Facility: HOSPITAL | Age: 55
Discharge: HOME | End: 2020-12-03
Attending: FAMILY MEDICINE
Payer: COMMERCIAL

## 2020-12-03 ENCOUNTER — NURSE TRIAGE (OUTPATIENT)
Dept: ADMINISTRATIVE | Facility: CLINIC | Age: 55
End: 2020-12-03

## 2020-12-03 VITALS
HEIGHT: 71 IN | TEMPERATURE: 99 F | HEART RATE: 62 BPM | BODY MASS INDEX: 24.77 KG/M2 | WEIGHT: 176.94 LBS | DIASTOLIC BLOOD PRESSURE: 88 MMHG | OXYGEN SATURATION: 99 % | SYSTOLIC BLOOD PRESSURE: 140 MMHG | RESPIRATION RATE: 15 BRPM

## 2020-12-03 DIAGNOSIS — R07.9 CHEST PAIN: Primary | ICD-10-CM

## 2020-12-03 LAB
ALBUMIN SERPL BCP-MCNC: 4.1 G/DL (ref 3.5–5.2)
ALP SERPL-CCNC: 61 U/L (ref 55–135)
ALT SERPL W/O P-5'-P-CCNC: 23 U/L (ref 10–44)
ANION GAP SERPL CALC-SCNC: 12 MMOL/L (ref 8–16)
AST SERPL-CCNC: 25 U/L (ref 10–40)
BASOPHILS # BLD AUTO: 0.02 K/UL (ref 0–0.2)
BASOPHILS NFR BLD: 0.4 % (ref 0–1.9)
BILIRUB SERPL-MCNC: 0.4 MG/DL (ref 0.1–1)
BNP SERPL-MCNC: 54 PG/ML (ref 0–99)
BUN SERPL-MCNC: 17 MG/DL (ref 6–20)
CALCIUM SERPL-MCNC: 9.4 MG/DL (ref 8.7–10.5)
CHLORIDE SERPL-SCNC: 107 MMOL/L (ref 95–110)
CO2 SERPL-SCNC: 23 MMOL/L (ref 23–29)
CREAT SERPL-MCNC: 1.5 MG/DL (ref 0.5–1.4)
D DIMER PPP IA.FEU-MCNC: <0.19 MG/L FEU
DIFFERENTIAL METHOD: NORMAL
EOSINOPHIL # BLD AUTO: 0.1 K/UL (ref 0–0.5)
EOSINOPHIL NFR BLD: 1.2 % (ref 0–8)
ERYTHROCYTE [DISTWIDTH] IN BLOOD BY AUTOMATED COUNT: 11.7 % (ref 11.5–14.5)
EST. GFR  (AFRICAN AMERICAN): >60 ML/MIN/1.73 M^2
EST. GFR  (NON AFRICAN AMERICAN): 52 ML/MIN/1.73 M^2
GLUCOSE SERPL-MCNC: 80 MG/DL (ref 70–110)
HCT VFR BLD AUTO: 43.2 % (ref 40–54)
HGB BLD-MCNC: 14.1 G/DL (ref 14–18)
HIV 1+2 AB+HIV1 P24 AG SERPL QL IA: NEGATIVE
IMM GRANULOCYTES # BLD AUTO: 0.01 K/UL (ref 0–0.04)
IMM GRANULOCYTES NFR BLD AUTO: 0.2 % (ref 0–0.5)
LYMPHOCYTES # BLD AUTO: 2.4 K/UL (ref 1–4.8)
LYMPHOCYTES NFR BLD: 42.1 % (ref 18–48)
MCH RBC QN AUTO: 29.9 PG (ref 27–31)
MCHC RBC AUTO-ENTMCNC: 32.6 G/DL (ref 32–36)
MCV RBC AUTO: 92 FL (ref 82–98)
MONOCYTES # BLD AUTO: 0.5 K/UL (ref 0.3–1)
MONOCYTES NFR BLD: 9.4 % (ref 4–15)
NEUTROPHILS # BLD AUTO: 2.6 K/UL (ref 1.8–7.7)
NEUTROPHILS NFR BLD: 46.7 % (ref 38–73)
NRBC BLD-RTO: 0 /100 WBC
PLATELET # BLD AUTO: 188 K/UL (ref 150–350)
PMV BLD AUTO: 10.9 FL (ref 9.2–12.9)
POTASSIUM SERPL-SCNC: 4.8 MMOL/L (ref 3.5–5.1)
PROT SERPL-MCNC: 8.2 G/DL (ref 6–8.4)
RBC # BLD AUTO: 4.71 M/UL (ref 4.6–6.2)
SARS-COV-2 RDRP RESP QL NAA+PROBE: NEGATIVE
SODIUM SERPL-SCNC: 142 MMOL/L (ref 136–145)
TROPONIN I SERPL DL<=0.01 NG/ML-MCNC: 0.01 NG/ML (ref 0–0.03)
TROPONIN I SERPL DL<=0.01 NG/ML-MCNC: 0.01 NG/ML (ref 0–0.03)
WBC # BLD AUTO: 5.63 K/UL (ref 3.9–12.7)

## 2020-12-03 PROCEDURE — U0002 COVID-19 LAB TEST NON-CDC: HCPCS

## 2020-12-03 PROCEDURE — 84484 ASSAY OF TROPONIN QUANT: CPT

## 2020-12-03 PROCEDURE — 85379 FIBRIN DEGRADATION QUANT: CPT

## 2020-12-03 PROCEDURE — 25000003 PHARM REV CODE 250: Performed by: NURSE PRACTITIONER

## 2020-12-03 PROCEDURE — 83880 ASSAY OF NATRIURETIC PEPTIDE: CPT

## 2020-12-03 PROCEDURE — 93005 ELECTROCARDIOGRAM TRACING: CPT

## 2020-12-03 PROCEDURE — 93010 EKG 12-LEAD: ICD-10-PCS | Mod: ,,, | Performed by: INTERNAL MEDICINE

## 2020-12-03 PROCEDURE — 93010 ELECTROCARDIOGRAM REPORT: CPT | Mod: ,,, | Performed by: INTERNAL MEDICINE

## 2020-12-03 PROCEDURE — 85025 COMPLETE CBC W/AUTO DIFF WBC: CPT

## 2020-12-03 PROCEDURE — 84484 ASSAY OF TROPONIN QUANT: CPT | Mod: 91

## 2020-12-03 PROCEDURE — 80053 COMPREHEN METABOLIC PANEL: CPT

## 2020-12-03 PROCEDURE — 25000242 PHARM REV CODE 250 ALT 637 W/ HCPCS: Performed by: FAMILY MEDICINE

## 2020-12-03 PROCEDURE — 99285 EMERGENCY DEPT VISIT HI MDM: CPT | Mod: 25

## 2020-12-03 PROCEDURE — 86703 HIV-1/HIV-2 1 RESULT ANTBDY: CPT

## 2020-12-03 RX ORDER — NITROGLYCERIN 0.4 MG/1
0.4 TABLET SUBLINGUAL
Status: COMPLETED | OUTPATIENT
Start: 2020-12-03 | End: 2020-12-03

## 2020-12-03 RX ORDER — ASPIRIN 325 MG
325 TABLET ORAL
Status: COMPLETED | OUTPATIENT
Start: 2020-12-03 | End: 2020-12-03

## 2020-12-03 RX ADMIN — ASPIRIN 325 MG ORAL TABLET 325 MG: 325 PILL ORAL at 06:12

## 2020-12-03 RX ADMIN — NITROGLYCERIN 0.4 MG: 0.4 TABLET, ORALLY DISINTEGRATING SUBLINGUAL at 06:12

## 2020-12-03 NOTE — TELEPHONE ENCOUNTER
Patient c/o left sided chest pain that started 2 days ago and comes and goes 3-4/10. It's been hurting constantly for about 5 min now. He had the coronavirus about 3 weeks ago. Patient refuses to call 911. States he would rather go to Dr. Escalona office. Patient urged to call 911 per protocol and still refuses. Care advice discussed, understanding verbalized. Please contact caller directly to discuss further.    Reason for Disposition   Chest pain lasting longer than 5 minutes and ANY of the following:* Over 45 years old* Over 30 years old and at least one cardiac risk factor (e.g., diabetes, high blood pressure, high cholesterol, smoker, or strong family history of heart disease)* History of heart disease (i.e., angina, heart attack, heart failure, bypass surgery, takes nitroglycerin)* Pain is crushing, pressure-like, or heavy    Additional Information   Negative: Shock suspected (e.g., cold/pale/clammy skin, too weak to stand, low BP, rapid pulse)   Negative: Difficult to awaken or acting confused (e.g., disoriented, slurred speech)   Negative: Passed out (i.e., fainted, collapsed and was not responding)   Negative: Severe difficulty breathing (e.g., struggling for each breath, speaks in single words)    Protocols used: CHEST PAIN-A-OH

## 2020-12-04 NOTE — ED PROVIDER NOTES
SCRIBE #1 NOTE: I, Isadora Sow, am scribing for, and in the presence of, Chaparrita Bailey MD. I have scribed the entire note.      History      Chief Complaint   Patient presents with    Chest Pain     beginning this am; radiates to L jaw, shoulder, and arm       Review of patient's allergies indicates:  No Known Allergies     HPI   HPI    12/3/2020, 6:46 PM   History obtained from the patient      History of Present Illness: Zane Allison is a 55 y.o. male patient, with a history of colon polyps, who presents to the Emergency Department for an evaluation of achy left sided chest pain that radiates into the left chest which onset gradually approximately one week prior to arrival. The patient states that the pain returned this morning, while the patient was driving, prompting his visit to the ED today. Symptoms are intermittent and moderate in severity. The patient states that he tested COVID positive approximately 20 days ago. No other mitigating or exacerbating factors reported. Associated sxs include nausea, dizziness and diaphoresis. Patient denies any SOB, palpitations, extremity weakness, numbness, leg swelling, cough, vomiting, and all other sxs at this time. No prior treatment reported. No further complaints or concerns at this time.         Arrival mode: Personal vehicle    PCP: López Escalona MD       Past Medical History:  Past Medical History:   Diagnosis Date    History of colon polyps     History of hepatitis C     treated 2016 with complete remission       Past Surgical History:  Past Surgical History:   Procedure Laterality Date    ANKLE SURGERY      COLONOSCOPY N/A 11/1/2017    Procedure: COLONOSCOPY;  Surgeon: Jose Alcocer MD;  Location: [facility];  Service: Endoscopy;  Laterality: N/A;    injury      left eye         Family History:  Family History   Problem Relation Age of Onset    Thyroid disease Mother     Diabetes Maternal Aunt     Hypertension Maternal Aunt      Thyroid disease Maternal Aunt     Hypertension Maternal Uncle     Cataracts Maternal Uncle        Social History:  Social History     Tobacco Use    Smoking status: Current Every Day Smoker     Packs/day: 1.00     Years: 20.00     Pack years: 20.00     Types: Cigarettes     Start date: 1993     Last attempt to quit: 2019     Years since quittin.5    Smokeless tobacco: Never Used   Substance and Sexual Activity    Alcohol use: Yes     Frequency: 2-3 times a week     Drinks per session: 5 or 6     Binge frequency: Monthly     Comment: weekends beer / stopped 2015    Drug use: No    Sexual activity: Yes     Partners: Female       ROS   Review of Systems   Constitutional: Positive for diaphoresis. Negative for fever.   HENT: Negative for sore throat.    Respiratory: Negative for cough and shortness of breath.    Cardiovascular: Positive for chest pain. Negative for palpitations and leg swelling.   Gastrointestinal: Positive for nausea. Negative for vomiting.   Genitourinary: Negative for dysuria.   Musculoskeletal: Negative for back pain.   Skin: Negative for rash.   Neurological: Positive for dizziness. Negative for tremors and weakness.   Hematological: Does not bruise/bleed easily.   All other systems reviewed and are negative.      Physical Exam      Initial Vitals [20 1743]   BP Pulse Resp Temp SpO2   (!) 150/81 79 20 98.6 °F (37 °C) 98 %      MAP       --          Physical Exam  Nursing Notes and Vital Signs Reviewed.  Constitutional: Patient is in no apparent distress. Well-developed and well-nourished.  Head: Atraumatic. Normocephalic.  Eyes: PERRL. EOM intact. Conjunctivae are not pale. No scleral icterus.  ENT: Mucous membranes are moist. Oropharynx is clear and symmetric.    Neck: Supple. Full ROM. No lymphadenopathy.  Cardiovascular: Regular rate. Regular rhythm. No murmurs, rubs, or gallops. Distal pulses are 2+ and symmetric.  Pulmonary/Chest: No respiratory distress. Clear to  "auscultation bilaterally. No wheezing or rales.  Abdominal: Soft and non-distended.  There is no tenderness.  No rebound, guarding, or rigidity. Good bowel sounds.  Genitourinary: No CVA tenderness  Musculoskeletal: Moves all extremities. No obvious deformities. No edema. No calf tenderness.  Skin: Warm and dry.  Neurological:  Alert, awake, and appropriate.  Normal speech.  No acute focal neurological deficits are appreciated.  Psychiatric: Normal affect. Good eye contact. Appropriate in content.    ED Course    Procedures  ED Vital Signs:  Vitals:    12/03/20 1743 12/03/20 1836 12/03/20 1847 12/03/20 1900   BP: (!) 150/81  (!) 155/88 (!) 157/78   Pulse: 79 71 71    Resp: 20  17    Temp: 98.6 °F (37 °C)      TempSrc: Oral      SpO2: 98%  98%    Weight: 80.3 kg (176 lb 14.7 oz)      Height: 5' 11" (1.803 m)       12/03/20 2008 12/03/20 2118   BP: (!) 151/78 (!) 140/88   Pulse: 67 62   Resp: 19 15   Temp:     TempSrc:     SpO2: 97% 99%   Weight:     Height:         Abnormal Lab Results:  Labs Reviewed   COMPREHENSIVE METABOLIC PANEL - Abnormal; Notable for the following components:       Result Value    Creatinine 1.5 (*)     eGFR if non  52 (*)     All other components within normal limits   CBC W/ AUTO DIFFERENTIAL   TROPONIN I   B-TYPE NATRIURETIC PEPTIDE   D DIMER, QUANTITATIVE   SARS-COV-2 RNA AMPLIFICATION, QUAL   HIV 1 / 2 ANTIBODY   TROPONIN I        All Lab Results:   Results for orders placed or performed during the hospital encounter of 12/03/20   CBC auto differential   Result Value Ref Range    WBC 5.63 3.90 - 12.70 K/uL    RBC 4.71 4.60 - 6.20 M/uL    Hemoglobin 14.1 14.0 - 18.0 g/dL    Hematocrit 43.2 40.0 - 54.0 %    MCV 92 82 - 98 fL    MCH 29.9 27.0 - 31.0 pg    MCHC 32.6 32.0 - 36.0 g/dL    RDW 11.7 11.5 - 14.5 %    Platelets 188 150 - 350 K/uL    MPV 10.9 9.2 - 12.9 fL    Immature Granulocytes 0.2 0.0 - 0.5 %    Gran # (ANC) 2.6 1.8 - 7.7 K/uL    Immature Grans (Abs) 0.01 0.00 - " 0.04 K/uL    Lymph # 2.4 1.0 - 4.8 K/uL    Mono # 0.5 0.3 - 1.0 K/uL    Eos # 0.1 0.0 - 0.5 K/uL    Baso # 0.02 0.00 - 0.20 K/uL    nRBC 0 0 /100 WBC    Gran % 46.7 38.0 - 73.0 %    Lymph % 42.1 18.0 - 48.0 %    Mono % 9.4 4.0 - 15.0 %    Eosinophil % 1.2 0.0 - 8.0 %    Basophil % 0.4 0.0 - 1.9 %    Differential Method Automated    Comprehensive metabolic panel   Result Value Ref Range    Sodium 142 136 - 145 mmol/L    Potassium 4.8 3.5 - 5.1 mmol/L    Chloride 107 95 - 110 mmol/L    CO2 23 23 - 29 mmol/L    Glucose 80 70 - 110 mg/dL    BUN 17 6 - 20 mg/dL    Creatinine 1.5 (H) 0.5 - 1.4 mg/dL    Calcium 9.4 8.7 - 10.5 mg/dL    Total Protein 8.2 6.0 - 8.4 g/dL    Albumin 4.1 3.5 - 5.2 g/dL    Total Bilirubin 0.4 0.1 - 1.0 mg/dL    Alkaline Phosphatase 61 55 - 135 U/L    AST 25 10 - 40 U/L    ALT 23 10 - 44 U/L    Anion Gap 12 8 - 16 mmol/L    eGFR if African American >60 >60 mL/min/1.73 m^2    eGFR if non African American 52 (A) >60 mL/min/1.73 m^2   Troponin I #1   Result Value Ref Range    Troponin I 0.012 0.000 - 0.026 ng/mL   BNP   Result Value Ref Range    BNP 54 0 - 99 pg/mL   D dimer, quantitative   Result Value Ref Range    D-Dimer <0.19 <0.50 mg/L FEU   COVID-19 Rapid Screening   Result Value Ref Range    SARS-CoV-2 RNA, Amplification, Qual Negative Negative   HIV 1/2 Ag/Ab (4th Gen)   Result Value Ref Range    HIV 1/2 Ag/Ab Negative Negative   Troponin I   Result Value Ref Range    Troponin I 0.008 0.000 - 0.026 ng/mL         Imaging Results:  Imaging Results          X-Ray Chest AP Portable (Final result)  Result time 12/03/20 18:31:56    Final result by Roland Trevino MD (12/03/20 18:31:56)                 Impression:      No acute findings.      Electronically signed by: Roland Trevino MD  Date:    12/03/2020  Time:    18:31             Narrative:    EXAMINATION:  XR CHEST AP PORTABLE    CLINICAL HISTORY:  Acute chest pain, Chest Pain;    COMPARISON:  None    FINDINGS:  Heart size is normal.  The lung fields are clear. No acute cardiopulmonary infiltrate.                                 The EKG was ordered, reviewed, and independently interpreted by the ED provider.  Interpretation time: 17:42  Rate: 77 BPM  Rhythm: normal sinus rhythm  Interpretation: T wave abnormality, consider inferolateral ischemia. No STEMI.               The Emergency Provider reviewed the vital signs and test results, which are outlined above.    ED Discussion     9:29 PM: Reassessed pt at this time.  Pt states his condition has improved at this time. Discussed with pt all pertinent ED information and results. Discussed pt dx and plan of tx. Gave pt all f/u and return to the ED instructions. All questions and concerns were addressed at this time. Pt expresses understanding of information and instructions, and is comfortable with plan to discharge. Pt is stable for discharge.    I discussed with patient and/or family/caretaker that evaluation in the ED does not suggest any emergent or life threatening medical conditions requiring immediate intervention beyond what was provided in the ED, and I believe patient is safe for discharge.  Regardless, an unremarkable evaluation in the ED does not preclude the development or presence of a serious of life threatening condition. As such, patient was instructed to return immediately for any worsening or change in current symptoms.             ED Medication(s):  Medications   aspirin tablet 325 mg (325 mg Oral Given 12/3/20 1845)   nitroGLYCERIN SL tablet 0.4 mg (0.4 mg Sublingual Given 12/3/20 1849)     Discharge Medication List as of 12/3/2020  9:28 PM          Follow-up Information     Schedule an appointment as soon as possible for a visit  with López Escalona MD.    Specialty: Internal Medicine  Why: As needed  Contact information:  [address]  [address]  [facility] [address]  [phone]                     Medical Decision Making    Medical Decision Making:   Clinical Tests:    Lab Tests: Reviewed and Ordered  Radiological Study: Reviewed and Ordered  Medical Tests: Reviewed and Ordered           Scribe Attestation:   Scribe #1: I performed the above scribed service and the documentation accurately describes the services I performed. I attest to the accuracy of the note.    Attending:   Physician Attestation Statement for Scribe #1: I, Chaparrita Bailey MD, personally performed the services described in this documentation, as scribed by Isadora Sow, in my presence, and it is both accurate and complete.          Clinical Impression       ICD-10-CM ICD-9-CM   1. Chest pain  R07.9 786.50       Disposition:   Disposition: Discharged  Condition: Stable         Chaparrita Bailey MD  12/04/20 1616

## 2020-12-04 NOTE — ED NOTES
Pt resting in bed. Respirations even and unlabored. No acute distress noted. Updated pt on plan of care, voiced understanding.

## 2021-02-04 ENCOUNTER — PATIENT OUTREACH (OUTPATIENT)
Dept: ADMINISTRATIVE | Facility: OTHER | Age: 56
End: 2021-02-04

## 2021-02-05 ENCOUNTER — OFFICE VISIT (OUTPATIENT)
Dept: OPHTHALMOLOGY | Facility: CLINIC | Age: 56
End: 2021-02-05
Payer: COMMERCIAL

## 2021-02-05 DIAGNOSIS — H52.7 REFRACTIVE ERRORS: ICD-10-CM

## 2021-02-05 DIAGNOSIS — H04.123 DRY EYES, BILATERAL: Primary | ICD-10-CM

## 2021-02-05 PROCEDURE — 99999 PR PBB SHADOW E&M-EST. PATIENT-LVL II: ICD-10-PCS | Mod: PBBFAC,,, | Performed by: OPTOMETRIST

## 2021-02-05 PROCEDURE — 92014 COMPRE OPH EXAM EST PT 1/>: CPT | Mod: S$GLB,,, | Performed by: OPTOMETRIST

## 2021-02-05 PROCEDURE — 92014 PR EYE EXAM, EST PATIENT,COMPREHESV: ICD-10-PCS | Mod: S$GLB,,, | Performed by: OPTOMETRIST

## 2021-02-05 PROCEDURE — 92015 DETERMINE REFRACTIVE STATE: CPT | Mod: S$GLB,,, | Performed by: OPTOMETRIST

## 2021-02-05 PROCEDURE — 92015 PR REFRACTION: ICD-10-PCS | Mod: S$GLB,,, | Performed by: OPTOMETRIST

## 2021-02-05 PROCEDURE — 99999 PR PBB SHADOW E&M-EST. PATIENT-LVL II: CPT | Mod: PBBFAC,,, | Performed by: OPTOMETRIST

## 2021-03-26 ENCOUNTER — OFFICE VISIT (OUTPATIENT)
Dept: INTERNAL MEDICINE | Facility: CLINIC | Age: 56
End: 2021-03-26
Payer: COMMERCIAL

## 2021-03-26 VITALS
BODY MASS INDEX: 26.41 KG/M2 | WEIGHT: 184.5 LBS | DIASTOLIC BLOOD PRESSURE: 73 MMHG | HEART RATE: 74 BPM | OXYGEN SATURATION: 97 % | SYSTOLIC BLOOD PRESSURE: 140 MMHG | HEIGHT: 70 IN | TEMPERATURE: 99 F

## 2021-03-26 DIAGNOSIS — H61.20 IMPACTED CERUMEN, UNSPECIFIED LATERALITY: Primary | ICD-10-CM

## 2021-03-26 PROCEDURE — 99999 PR PBB SHADOW E&M-EST. PATIENT-LVL III: CPT | Mod: PBBFAC,,, | Performed by: INTERNAL MEDICINE

## 2021-03-26 PROCEDURE — 3008F BODY MASS INDEX DOCD: CPT | Mod: CPTII,S$GLB,, | Performed by: INTERNAL MEDICINE

## 2021-03-26 PROCEDURE — 1126F PR PAIN SEVERITY QUANTIFIED, NO PAIN PRESENT: ICD-10-PCS | Mod: S$GLB,,, | Performed by: INTERNAL MEDICINE

## 2021-03-26 PROCEDURE — 99213 PR OFFICE/OUTPT VISIT, EST, LEVL III, 20-29 MIN: ICD-10-PCS | Mod: S$GLB,,, | Performed by: INTERNAL MEDICINE

## 2021-03-26 PROCEDURE — 99999 PR PBB SHADOW E&M-EST. PATIENT-LVL III: ICD-10-PCS | Mod: PBBFAC,,, | Performed by: INTERNAL MEDICINE

## 2021-03-26 PROCEDURE — 3008F PR BODY MASS INDEX (BMI) DOCUMENTED: ICD-10-PCS | Mod: CPTII,S$GLB,, | Performed by: INTERNAL MEDICINE

## 2021-03-26 PROCEDURE — 99213 OFFICE O/P EST LOW 20 MIN: CPT | Mod: S$GLB,,, | Performed by: INTERNAL MEDICINE

## 2021-03-26 PROCEDURE — 1126F AMNT PAIN NOTED NONE PRSNT: CPT | Mod: S$GLB,,, | Performed by: INTERNAL MEDICINE

## 2021-03-26 RX ORDER — CLOBETASOL PROPIONATE 0.5 MG/G
CREAM TOPICAL 2 TIMES DAILY
Qty: 45 G | Refills: 2 | Status: SHIPPED | OUTPATIENT
Start: 2021-03-26 | End: 2021-11-05

## 2021-03-26 RX ORDER — VALACYCLOVIR HYDROCHLORIDE 500 MG/1
500 TABLET, FILM COATED ORAL 3 TIMES DAILY
Qty: 15 TABLET | Refills: 1 | Status: SHIPPED | OUTPATIENT
Start: 2021-03-26 | End: 2022-01-03 | Stop reason: SDUPTHER

## 2021-11-02 ENCOUNTER — TELEPHONE (OUTPATIENT)
Dept: FAMILY MEDICINE | Facility: CLINIC | Age: 56
End: 2021-11-02
Payer: COMMERCIAL

## 2021-11-05 ENCOUNTER — LAB VISIT (OUTPATIENT)
Dept: LAB | Facility: HOSPITAL | Age: 56
End: 2021-11-05
Attending: INTERNAL MEDICINE
Payer: COMMERCIAL

## 2021-11-05 ENCOUNTER — OFFICE VISIT (OUTPATIENT)
Dept: INTERNAL MEDICINE | Facility: CLINIC | Age: 56
End: 2021-11-05
Payer: COMMERCIAL

## 2021-11-05 VITALS
WEIGHT: 182.56 LBS | HEART RATE: 57 BPM | BODY MASS INDEX: 25.56 KG/M2 | SYSTOLIC BLOOD PRESSURE: 150 MMHG | OXYGEN SATURATION: 98 % | DIASTOLIC BLOOD PRESSURE: 90 MMHG | HEIGHT: 71 IN

## 2021-11-05 DIAGNOSIS — I10 BENIGN HYPERTENSION: Primary | ICD-10-CM

## 2021-11-05 DIAGNOSIS — K40.90 NON-RECURRENT UNILATERAL INGUINAL HERNIA WITHOUT OBSTRUCTION OR GANGRENE: ICD-10-CM

## 2021-11-05 DIAGNOSIS — I10 BENIGN HYPERTENSION: ICD-10-CM

## 2021-11-05 DIAGNOSIS — Z12.5 PROSTATE CANCER SCREENING: ICD-10-CM

## 2021-11-05 DIAGNOSIS — R35.0 URINARY FREQUENCY: ICD-10-CM

## 2021-11-05 LAB
ALBUMIN SERPL BCP-MCNC: 4 G/DL (ref 3.5–5.2)
ALP SERPL-CCNC: 59 U/L (ref 55–135)
ALT SERPL W/O P-5'-P-CCNC: 17 U/L (ref 10–44)
ANION GAP SERPL CALC-SCNC: 10 MMOL/L (ref 8–16)
AST SERPL-CCNC: 16 U/L (ref 10–40)
BASOPHILS # BLD AUTO: 0.04 K/UL (ref 0–0.2)
BASOPHILS NFR BLD: 0.7 % (ref 0–1.9)
BILIRUB SERPL-MCNC: 0.5 MG/DL (ref 0.1–1)
BUN SERPL-MCNC: 13 MG/DL (ref 6–20)
CALCIUM SERPL-MCNC: 9.4 MG/DL (ref 8.7–10.5)
CHLORIDE SERPL-SCNC: 105 MMOL/L (ref 95–110)
CHOLEST SERPL-MCNC: 172 MG/DL (ref 120–199)
CHOLEST/HDLC SERPL: 4.2 {RATIO} (ref 2–5)
CO2 SERPL-SCNC: 27 MMOL/L (ref 23–29)
COMPLEXED PSA SERPL-MCNC: 0.58 NG/ML (ref 0–4)
CREAT SERPL-MCNC: 1.1 MG/DL (ref 0.5–1.4)
DIFFERENTIAL METHOD: ABNORMAL
EOSINOPHIL # BLD AUTO: 0.2 K/UL (ref 0–0.5)
EOSINOPHIL NFR BLD: 2.6 % (ref 0–8)
ERYTHROCYTE [DISTWIDTH] IN BLOOD BY AUTOMATED COUNT: 12.4 % (ref 11.5–14.5)
EST. GFR  (AFRICAN AMERICAN): >60 ML/MIN/1.73 M^2
EST. GFR  (NON AFRICAN AMERICAN): >60 ML/MIN/1.73 M^2
GLUCOSE SERPL-MCNC: 74 MG/DL (ref 70–110)
HCT VFR BLD AUTO: 48.6 % (ref 40–54)
HDLC SERPL-MCNC: 41 MG/DL (ref 40–75)
HDLC SERPL: 23.8 % (ref 20–50)
HGB BLD-MCNC: 15 G/DL (ref 14–18)
IMM GRANULOCYTES # BLD AUTO: 0.01 K/UL (ref 0–0.04)
IMM GRANULOCYTES NFR BLD AUTO: 0.2 % (ref 0–0.5)
LDLC SERPL CALC-MCNC: 110.6 MG/DL (ref 63–159)
LYMPHOCYTES # BLD AUTO: 3.2 K/UL (ref 1–4.8)
LYMPHOCYTES NFR BLD: 51.3 % (ref 18–48)
MCH RBC QN AUTO: 29.1 PG (ref 27–31)
MCHC RBC AUTO-ENTMCNC: 30.9 G/DL (ref 32–36)
MCV RBC AUTO: 94 FL (ref 82–98)
MONOCYTES # BLD AUTO: 0.5 K/UL (ref 0.3–1)
MONOCYTES NFR BLD: 7.7 % (ref 4–15)
NEUTROPHILS # BLD AUTO: 2.3 K/UL (ref 1.8–7.7)
NEUTROPHILS NFR BLD: 37.5 % (ref 38–73)
NONHDLC SERPL-MCNC: 131 MG/DL
NRBC BLD-RTO: 0 /100 WBC
PLATELET # BLD AUTO: 175 K/UL (ref 150–450)
PMV BLD AUTO: 11.8 FL (ref 9.2–12.9)
POTASSIUM SERPL-SCNC: 4.4 MMOL/L (ref 3.5–5.1)
PROT SERPL-MCNC: 8.2 G/DL (ref 6–8.4)
RBC # BLD AUTO: 5.15 M/UL (ref 4.6–6.2)
SODIUM SERPL-SCNC: 142 MMOL/L (ref 136–145)
TRIGL SERPL-MCNC: 102 MG/DL (ref 30–150)
TSH SERPL DL<=0.005 MIU/L-ACNC: 0.61 UIU/ML (ref 0.4–4)
WBC # BLD AUTO: 6.14 K/UL (ref 3.9–12.7)

## 2021-11-05 PROCEDURE — 84153 ASSAY OF PSA TOTAL: CPT | Performed by: INTERNAL MEDICINE

## 2021-11-05 PROCEDURE — 1160F PR REVIEW ALL MEDS BY PRESCRIBER/CLIN PHARMACIST DOCUMENTED: ICD-10-PCS | Mod: CPTII,S$GLB,, | Performed by: INTERNAL MEDICINE

## 2021-11-05 PROCEDURE — 1159F MED LIST DOCD IN RCRD: CPT | Mod: CPTII,S$GLB,, | Performed by: INTERNAL MEDICINE

## 2021-11-05 PROCEDURE — 80061 LIPID PANEL: CPT | Performed by: INTERNAL MEDICINE

## 2021-11-05 PROCEDURE — 3077F PR MOST RECENT SYSTOLIC BLOOD PRESSURE >= 140 MM HG: ICD-10-PCS | Mod: CPTII,S$GLB,, | Performed by: INTERNAL MEDICINE

## 2021-11-05 PROCEDURE — 1159F PR MEDICATION LIST DOCUMENTED IN MEDICAL RECORD: ICD-10-PCS | Mod: CPTII,S$GLB,, | Performed by: INTERNAL MEDICINE

## 2021-11-05 PROCEDURE — 1160F RVW MEDS BY RX/DR IN RCRD: CPT | Mod: CPTII,S$GLB,, | Performed by: INTERNAL MEDICINE

## 2021-11-05 PROCEDURE — 3077F SYST BP >= 140 MM HG: CPT | Mod: CPTII,S$GLB,, | Performed by: INTERNAL MEDICINE

## 2021-11-05 PROCEDURE — 3008F BODY MASS INDEX DOCD: CPT | Mod: CPTII,S$GLB,, | Performed by: INTERNAL MEDICINE

## 2021-11-05 PROCEDURE — 99999 PR PBB SHADOW E&M-EST. PATIENT-LVL IV: ICD-10-PCS | Mod: PBBFAC,,, | Performed by: INTERNAL MEDICINE

## 2021-11-05 PROCEDURE — 80053 COMPREHEN METABOLIC PANEL: CPT | Performed by: INTERNAL MEDICINE

## 2021-11-05 PROCEDURE — 99214 OFFICE O/P EST MOD 30 MIN: CPT | Mod: S$GLB,,, | Performed by: INTERNAL MEDICINE

## 2021-11-05 PROCEDURE — 84443 ASSAY THYROID STIM HORMONE: CPT | Performed by: INTERNAL MEDICINE

## 2021-11-05 PROCEDURE — 99214 PR OFFICE/OUTPT VISIT, EST, LEVL IV, 30-39 MIN: ICD-10-PCS | Mod: S$GLB,,, | Performed by: INTERNAL MEDICINE

## 2021-11-05 PROCEDURE — 99999 PR PBB SHADOW E&M-EST. PATIENT-LVL IV: CPT | Mod: PBBFAC,,, | Performed by: INTERNAL MEDICINE

## 2021-11-05 PROCEDURE — 3080F PR MOST RECENT DIASTOLIC BLOOD PRESSURE >= 90 MM HG: ICD-10-PCS | Mod: CPTII,S$GLB,, | Performed by: INTERNAL MEDICINE

## 2021-11-05 PROCEDURE — 3080F DIAST BP >= 90 MM HG: CPT | Mod: CPTII,S$GLB,, | Performed by: INTERNAL MEDICINE

## 2021-11-05 PROCEDURE — 3008F PR BODY MASS INDEX (BMI) DOCUMENTED: ICD-10-PCS | Mod: CPTII,S$GLB,, | Performed by: INTERNAL MEDICINE

## 2021-11-05 PROCEDURE — 87086 URINE CULTURE/COLONY COUNT: CPT | Performed by: INTERNAL MEDICINE

## 2021-11-05 PROCEDURE — 36415 COLL VENOUS BLD VENIPUNCTURE: CPT | Mod: PO | Performed by: INTERNAL MEDICINE

## 2021-11-05 PROCEDURE — 85025 COMPLETE CBC W/AUTO DIFF WBC: CPT | Performed by: INTERNAL MEDICINE

## 2021-11-06 LAB — BACTERIA UR CULT: NO GROWTH

## 2021-11-29 ENCOUNTER — OFFICE VISIT (OUTPATIENT)
Dept: INTERNAL MEDICINE | Facility: CLINIC | Age: 56
End: 2021-11-29
Payer: COMMERCIAL

## 2021-11-29 VITALS
WEIGHT: 181.19 LBS | SYSTOLIC BLOOD PRESSURE: 140 MMHG | DIASTOLIC BLOOD PRESSURE: 80 MMHG | OXYGEN SATURATION: 98 % | HEART RATE: 98 BPM | BODY MASS INDEX: 25.37 KG/M2 | HEIGHT: 71 IN

## 2021-11-29 DIAGNOSIS — I10 PRIMARY HYPERTENSION: ICD-10-CM

## 2021-11-29 DIAGNOSIS — Z00.00 ROUTINE GENERAL MEDICAL EXAMINATION AT A HEALTH CARE FACILITY: Primary | ICD-10-CM

## 2021-11-29 DIAGNOSIS — Z12.11 SCREEN FOR COLON CANCER: ICD-10-CM

## 2021-11-29 PROCEDURE — 99396 PR PREVENTIVE VISIT,EST,40-64: ICD-10-PCS | Mod: S$GLB,,, | Performed by: INTERNAL MEDICINE

## 2021-11-29 PROCEDURE — 99999 PR PBB SHADOW E&M-EST. PATIENT-LVL III: ICD-10-PCS | Mod: PBBFAC,,, | Performed by: INTERNAL MEDICINE

## 2021-11-29 PROCEDURE — 99396 PREV VISIT EST AGE 40-64: CPT | Mod: S$GLB,,, | Performed by: INTERNAL MEDICINE

## 2021-11-29 PROCEDURE — 99999 PR PBB SHADOW E&M-EST. PATIENT-LVL III: CPT | Mod: PBBFAC,,, | Performed by: INTERNAL MEDICINE

## 2021-11-29 PROCEDURE — 4010F PR ACE/ARB THEARPY RXD/TAKEN: ICD-10-PCS | Mod: CPTII,S$GLB,, | Performed by: INTERNAL MEDICINE

## 2021-11-29 PROCEDURE — 4010F ACE/ARB THERAPY RXD/TAKEN: CPT | Mod: CPTII,S$GLB,, | Performed by: INTERNAL MEDICINE

## 2021-11-29 RX ORDER — LISINOPRIL AND HYDROCHLOROTHIAZIDE 10; 12.5 MG/1; MG/1
1 TABLET ORAL DAILY
Qty: 90 TABLET | Refills: 3 | Status: SHIPPED | OUTPATIENT
Start: 2021-11-29 | End: 2023-01-11

## 2021-12-01 ENCOUNTER — PATIENT MESSAGE (OUTPATIENT)
Dept: ENDOSCOPY | Facility: HOSPITAL | Age: 56
End: 2021-12-01
Payer: COMMERCIAL

## 2021-12-02 RX ORDER — SODIUM, POTASSIUM,MAG SULFATES 17.5-3.13G
1 SOLUTION, RECONSTITUTED, ORAL ORAL DAILY
Qty: 1 KIT | Refills: 0 | Status: SHIPPED | OUTPATIENT
Start: 2021-12-02 | End: 2021-12-04

## 2021-12-16 ENCOUNTER — OFFICE VISIT (OUTPATIENT)
Dept: SURGERY | Facility: CLINIC | Age: 56
End: 2021-12-16
Payer: COMMERCIAL

## 2021-12-16 VITALS
TEMPERATURE: 98 F | WEIGHT: 176.56 LBS | SYSTOLIC BLOOD PRESSURE: 115 MMHG | BODY MASS INDEX: 24.63 KG/M2 | HEART RATE: 60 BPM | DIASTOLIC BLOOD PRESSURE: 72 MMHG

## 2021-12-16 DIAGNOSIS — K40.90 REDUCIBLE LEFT INGUINAL HERNIA: Primary | ICD-10-CM

## 2021-12-16 PROCEDURE — 99204 OFFICE O/P NEW MOD 45 MIN: CPT | Mod: S$GLB,,, | Performed by: SURGERY

## 2021-12-16 PROCEDURE — 99999 PR PBB SHADOW E&M-EST. PATIENT-LVL III: CPT | Mod: PBBFAC,,, | Performed by: SURGERY

## 2021-12-16 PROCEDURE — 4010F PR ACE/ARB THEARPY RXD/TAKEN: ICD-10-PCS | Mod: CPTII,S$GLB,, | Performed by: SURGERY

## 2021-12-16 PROCEDURE — 4010F ACE/ARB THERAPY RXD/TAKEN: CPT | Mod: CPTII,S$GLB,, | Performed by: SURGERY

## 2021-12-16 PROCEDURE — 99999 PR PBB SHADOW E&M-EST. PATIENT-LVL III: ICD-10-PCS | Mod: PBBFAC,,, | Performed by: SURGERY

## 2021-12-16 PROCEDURE — 99204 PR OFFICE/OUTPT VISIT, NEW, LEVL IV, 45-59 MIN: ICD-10-PCS | Mod: S$GLB,,, | Performed by: SURGERY

## 2021-12-27 ENCOUNTER — TELEPHONE (OUTPATIENT)
Dept: ENDOSCOPY | Facility: HOSPITAL | Age: 56
End: 2021-12-27
Payer: COMMERCIAL

## 2021-12-27 ENCOUNTER — PATIENT MESSAGE (OUTPATIENT)
Dept: ENDOSCOPY | Facility: HOSPITAL | Age: 56
End: 2021-12-27
Payer: COMMERCIAL

## 2021-12-28 ENCOUNTER — ANESTHESIA EVENT (OUTPATIENT)
Dept: ENDOSCOPY | Facility: HOSPITAL | Age: 56
End: 2021-12-28
Payer: COMMERCIAL

## 2021-12-28 ENCOUNTER — HOSPITAL ENCOUNTER (OUTPATIENT)
Facility: HOSPITAL | Age: 56
Discharge: HOME | End: 2021-12-28
Attending: INTERNAL MEDICINE | Admitting: INTERNAL MEDICINE
Payer: COMMERCIAL

## 2021-12-28 ENCOUNTER — ANESTHESIA (OUTPATIENT)
Dept: ENDOSCOPY | Facility: HOSPITAL | Age: 56
End: 2021-12-28
Payer: COMMERCIAL

## 2021-12-28 DIAGNOSIS — Z86.010 HISTORY OF COLON POLYPS: Primary | ICD-10-CM

## 2021-12-28 PROCEDURE — 45381 COLONOSCOPY SUBMUCOUS NJX: CPT | Performed by: INTERNAL MEDICINE

## 2021-12-28 PROCEDURE — 37000008 HC ANESTHESIA 1ST 15 MINUTES: Performed by: INTERNAL MEDICINE

## 2021-12-28 PROCEDURE — 88305 TISSUE EXAM BY PATHOLOGIST: CPT | Mod: 26,,, | Performed by: PATHOLOGY

## 2021-12-28 PROCEDURE — 45385 COLONOSCOPY W/LESION REMOVAL: CPT | Performed by: INTERNAL MEDICINE

## 2021-12-28 PROCEDURE — 27201028 HC NEEDLE, SCLERO: Performed by: INTERNAL MEDICINE

## 2021-12-28 PROCEDURE — 45385 PR COLONOSCOPY,REMV LESN,SNARE: ICD-10-PCS | Mod: 33,,, | Performed by: INTERNAL MEDICINE

## 2021-12-28 PROCEDURE — 45380 COLONOSCOPY AND BIOPSY: CPT | Mod: 59,,, | Performed by: INTERNAL MEDICINE

## 2021-12-28 PROCEDURE — 27201012 HC FORCEPS, HOT/COLD, DISP: Performed by: INTERNAL MEDICINE

## 2021-12-28 PROCEDURE — 63600175 PHARM REV CODE 636 W HCPCS: Performed by: INTERNAL MEDICINE

## 2021-12-28 PROCEDURE — 45385 COLONOSCOPY W/LESION REMOVAL: CPT | Mod: 33,,, | Performed by: INTERNAL MEDICINE

## 2021-12-28 PROCEDURE — 45381 COLONOSCOPY SUBMUCOUS NJX: CPT | Mod: 51,,, | Performed by: INTERNAL MEDICINE

## 2021-12-28 PROCEDURE — 45381 PR COLONOSCPY,FLEX,W/DIR SUBMUC INJECT: ICD-10-PCS | Mod: 51,,, | Performed by: INTERNAL MEDICINE

## 2021-12-28 PROCEDURE — 45380 PR COLONOSCOPY,BIOPSY: ICD-10-PCS | Mod: 59,,, | Performed by: INTERNAL MEDICINE

## 2021-12-28 PROCEDURE — 45380 COLONOSCOPY AND BIOPSY: CPT | Performed by: INTERNAL MEDICINE

## 2021-12-28 PROCEDURE — 88305 TISSUE EXAM BY PATHOLOGIST: CPT | Mod: 59 | Performed by: PATHOLOGY

## 2021-12-28 PROCEDURE — 88305 TISSUE EXAM BY PATHOLOGIST: ICD-10-PCS | Mod: 26,,, | Performed by: PATHOLOGY

## 2021-12-28 PROCEDURE — 25000003 PHARM REV CODE 250: Performed by: ANESTHESIOLOGY

## 2021-12-28 PROCEDURE — 27201089 HC SNARE, DISP (ANY): Performed by: INTERNAL MEDICINE

## 2021-12-28 PROCEDURE — 37000009 HC ANESTHESIA EA ADD 15 MINS: Performed by: INTERNAL MEDICINE

## 2021-12-28 PROCEDURE — 63600175 PHARM REV CODE 636 W HCPCS: Performed by: ANESTHESIOLOGY

## 2021-12-28 RX ORDER — LIDOCAINE HCL/PF 100 MG/5ML
SYRINGE (ML) INTRAVENOUS
Status: DISCONTINUED | OUTPATIENT
Start: 2021-12-28 | End: 2021-12-28

## 2021-12-28 RX ORDER — EPINEPHRINE 1 MG/ML
INJECTION, SOLUTION INTRACARDIAC; INTRAMUSCULAR; INTRAVENOUS; SUBCUTANEOUS
Status: COMPLETED | OUTPATIENT
Start: 2021-12-28 | End: 2021-12-28

## 2021-12-28 RX ORDER — SODIUM CHLORIDE, SODIUM LACTATE, POTASSIUM CHLORIDE, CALCIUM CHLORIDE 600; 310; 30; 20 MG/100ML; MG/100ML; MG/100ML; MG/100ML
INJECTION, SOLUTION INTRAVENOUS CONTINUOUS PRN
Status: DISCONTINUED | OUTPATIENT
Start: 2021-12-28 | End: 2021-12-28

## 2021-12-28 RX ORDER — PROPOFOL 10 MG/ML
VIAL (ML) INTRAVENOUS
Status: DISCONTINUED | OUTPATIENT
Start: 2021-12-28 | End: 2021-12-28

## 2021-12-28 RX ADMIN — PROPOFOL 30 MG: 10 INJECTION, EMULSION INTRAVENOUS at 11:12

## 2021-12-28 RX ADMIN — PROPOFOL 50 MG: 10 INJECTION, EMULSION INTRAVENOUS at 11:12

## 2021-12-28 RX ADMIN — EPINEPHRINE 0.3 MG: 1 INJECTION, SOLUTION INTRAMUSCULAR; SUBCUTANEOUS at 11:12

## 2021-12-28 RX ADMIN — LIDOCAINE HYDROCHLORIDE 50 MG: 20 INJECTION, SOLUTION INTRAVENOUS at 11:12

## 2021-12-28 RX ADMIN — PROPOFOL 150 MG: 10 INJECTION, EMULSION INTRAVENOUS at 11:12

## 2021-12-28 RX ADMIN — SODIUM CHLORIDE, SODIUM LACTATE, POTASSIUM CHLORIDE, AND CALCIUM CHLORIDE: .6; .31; .03; .02 INJECTION, SOLUTION INTRAVENOUS at 11:12

## 2021-12-30 VITALS
SYSTOLIC BLOOD PRESSURE: 117 MMHG | TEMPERATURE: 98 F | HEART RATE: 54 BPM | BODY MASS INDEX: 23.8 KG/M2 | RESPIRATION RATE: 18 BRPM | WEIGHT: 170 LBS | DIASTOLIC BLOOD PRESSURE: 76 MMHG | HEIGHT: 71 IN | OXYGEN SATURATION: 98 %

## 2022-01-03 ENCOUNTER — OFFICE VISIT (OUTPATIENT)
Dept: INTERNAL MEDICINE | Facility: CLINIC | Age: 57
End: 2022-01-03
Payer: COMMERCIAL

## 2022-01-03 VITALS
WEIGHT: 182.75 LBS | DIASTOLIC BLOOD PRESSURE: 80 MMHG | SYSTOLIC BLOOD PRESSURE: 130 MMHG | BODY MASS INDEX: 25.58 KG/M2 | OXYGEN SATURATION: 97 % | HEIGHT: 71 IN | HEART RATE: 56 BPM

## 2022-01-03 DIAGNOSIS — M75.40 ROTATOR CUFF IMPINGEMENT SYNDROME, UNSPECIFIED LATERALITY: ICD-10-CM

## 2022-01-03 DIAGNOSIS — I10 PRIMARY HYPERTENSION: Primary | ICD-10-CM

## 2022-01-03 LAB
FINAL PATHOLOGIC DIAGNOSIS: NORMAL
GROSS: NORMAL
Lab: NORMAL

## 2022-01-03 PROCEDURE — 1160F RVW MEDS BY RX/DR IN RCRD: CPT | Mod: CPTII,S$GLB,, | Performed by: INTERNAL MEDICINE

## 2022-01-03 PROCEDURE — 1160F PR REVIEW ALL MEDS BY PRESCRIBER/CLIN PHARMACIST DOCUMENTED: ICD-10-PCS | Mod: CPTII,S$GLB,, | Performed by: INTERNAL MEDICINE

## 2022-01-03 PROCEDURE — 99999 PR PBB SHADOW E&M-EST. PATIENT-LVL III: CPT | Mod: PBBFAC,,, | Performed by: INTERNAL MEDICINE

## 2022-01-03 PROCEDURE — 3075F PR MOST RECENT SYSTOLIC BLOOD PRESS GE 130-139MM HG: ICD-10-PCS | Mod: CPTII,S$GLB,, | Performed by: INTERNAL MEDICINE

## 2022-01-03 PROCEDURE — 3008F BODY MASS INDEX DOCD: CPT | Mod: CPTII,S$GLB,, | Performed by: INTERNAL MEDICINE

## 2022-01-03 PROCEDURE — 3075F SYST BP GE 130 - 139MM HG: CPT | Mod: CPTII,S$GLB,, | Performed by: INTERNAL MEDICINE

## 2022-01-03 PROCEDURE — 99213 OFFICE O/P EST LOW 20 MIN: CPT | Mod: S$GLB,,, | Performed by: INTERNAL MEDICINE

## 2022-01-03 PROCEDURE — 3079F DIAST BP 80-89 MM HG: CPT | Mod: CPTII,S$GLB,, | Performed by: INTERNAL MEDICINE

## 2022-01-03 PROCEDURE — 3079F PR MOST RECENT DIASTOLIC BLOOD PRESSURE 80-89 MM HG: ICD-10-PCS | Mod: CPTII,S$GLB,, | Performed by: INTERNAL MEDICINE

## 2022-01-03 PROCEDURE — 99213 PR OFFICE/OUTPT VISIT, EST, LEVL III, 20-29 MIN: ICD-10-PCS | Mod: S$GLB,,, | Performed by: INTERNAL MEDICINE

## 2022-01-03 PROCEDURE — 1159F PR MEDICATION LIST DOCUMENTED IN MEDICAL RECORD: ICD-10-PCS | Mod: CPTII,S$GLB,, | Performed by: INTERNAL MEDICINE

## 2022-01-03 PROCEDURE — 3008F PR BODY MASS INDEX (BMI) DOCUMENTED: ICD-10-PCS | Mod: CPTII,S$GLB,, | Performed by: INTERNAL MEDICINE

## 2022-01-03 PROCEDURE — 99999 PR PBB SHADOW E&M-EST. PATIENT-LVL III: ICD-10-PCS | Mod: PBBFAC,,, | Performed by: INTERNAL MEDICINE

## 2022-01-03 PROCEDURE — 1159F MED LIST DOCD IN RCRD: CPT | Mod: CPTII,S$GLB,, | Performed by: INTERNAL MEDICINE

## 2022-01-03 RX ORDER — DICLOFENAC SODIUM 75 MG/1
75 TABLET, DELAYED RELEASE ORAL 2 TIMES DAILY
Qty: 60 TABLET | Refills: 3 | Status: SHIPPED | OUTPATIENT
Start: 2022-01-03 | End: 2023-01-11

## 2022-01-03 RX ORDER — VALACYCLOVIR HYDROCHLORIDE 500 MG/1
500 TABLET, FILM COATED ORAL 3 TIMES DAILY
Qty: 15 TABLET | Refills: 1 | Status: SHIPPED | OUTPATIENT
Start: 2022-01-03 | End: 2023-01-11

## 2022-01-03 NOTE — PROGRESS NOTES
"HPI:  Patient is a 56-year-old man who comes today with complaints of pain in his left shoulder and left anterior lateral chest wall.  He denies any trauma or injury to either the shoulder are the chest wall.  He states that the shoulders been bothered him for about 3 weeks today chest wall for 1-2 weeks.  His blood pressures been doing much better.    Current meds have been verified and updated per the EMR  Exam:/80 (BP Location: Right arm)   Pulse (!) 56   Ht 5' 11" (1.803 m)   Wt 82.9 kg (182 lb 12.2 oz)   SpO2 97%   BMI 25.49 kg/m²   His left shoulder shows pain with external rotation.  He has a positive impingement sign.  He has tenderness on the left lateral anterior chest wall on palpation.  Chest is clear Cardiovascular regular rate and rhythm without murmur gallop or rub    Lab Results   Component Value Date    WBC 6.14 11/05/2021    HGB 15.0 11/05/2021    HCT 48.6 11/05/2021     11/05/2021    CHOL 172 11/05/2021    TRIG 102 11/05/2021    HDL 41 11/05/2021    ALT 17 11/05/2021    AST 16 11/05/2021     11/05/2021    K 4.4 11/05/2021     11/05/2021    CREATININE 1.1 11/05/2021    BUN 13 11/05/2021    CO2 27 11/05/2021    TSH 0.615 11/05/2021    PSA 0.58 11/05/2021       Impression:  Hypertension, much improved  Left rotator cuff syndrome  Left anterior lateral chest wall pain  Patient Active Problem List   Diagnosis    History of colon polyps    Hypertension       Plan:  Orders Placed This Encounter    Ambulatory referral/consult to Physical/Occupational Therapy    valACYclovir (VALTREX) 500 MG tablet    diclofenac (VOLTAREN) 75 MG EC tablet   Patient started on diclofenac 75 mg twice a day.  Patient was referred to see physical therapy.  If he is not improving in 2-3 weeks he should come back and we would inject some steroids.  He may eventually need to have an MRI of the shoulder.      This note is generated with speech recognition software and is subject to transcription " error and sound alike phrases that may be missed by proofreading.

## 2022-06-23 ENCOUNTER — OFFICE VISIT (OUTPATIENT)
Dept: SURGERY | Facility: CLINIC | Age: 57
End: 2022-06-23
Payer: COMMERCIAL

## 2022-06-23 VITALS
SYSTOLIC BLOOD PRESSURE: 129 MMHG | DIASTOLIC BLOOD PRESSURE: 75 MMHG | BODY MASS INDEX: 25.24 KG/M2 | TEMPERATURE: 98 F | WEIGHT: 181 LBS | HEART RATE: 78 BPM

## 2022-06-23 DIAGNOSIS — G93.5 HERNIA CEREBRI: Primary | ICD-10-CM

## 2022-06-23 PROBLEM — Z86.0100 HISTORY OF COLON POLYPS: Status: ACTIVE | Noted: 2022-02-22

## 2022-06-23 PROBLEM — Z86.010 HISTORY OF COLON POLYPS: Status: ACTIVE | Noted: 2022-02-22

## 2022-06-23 PROBLEM — I10 HYPERTENSION: Status: ACTIVE | Noted: 2022-02-22

## 2022-06-23 PROCEDURE — 1159F MED LIST DOCD IN RCRD: CPT | Mod: CPTII,S$GLB,, | Performed by: SURGERY

## 2022-06-23 PROCEDURE — 99212 PR OFFICE/OUTPT VISIT, EST, LEVL II, 10-19 MIN: ICD-10-PCS | Mod: S$GLB,,, | Performed by: SURGERY

## 2022-06-23 PROCEDURE — 3074F PR MOST RECENT SYSTOLIC BLOOD PRESSURE < 130 MM HG: ICD-10-PCS | Mod: CPTII,S$GLB,, | Performed by: SURGERY

## 2022-06-23 PROCEDURE — 99212 OFFICE O/P EST SF 10 MIN: CPT | Mod: S$GLB,,, | Performed by: SURGERY

## 2022-06-23 PROCEDURE — 3008F BODY MASS INDEX DOCD: CPT | Mod: CPTII,S$GLB,, | Performed by: SURGERY

## 2022-06-23 PROCEDURE — 1159F PR MEDICATION LIST DOCUMENTED IN MEDICAL RECORD: ICD-10-PCS | Mod: CPTII,S$GLB,, | Performed by: SURGERY

## 2022-06-23 PROCEDURE — 3008F PR BODY MASS INDEX (BMI) DOCUMENTED: ICD-10-PCS | Mod: CPTII,S$GLB,, | Performed by: SURGERY

## 2022-06-23 PROCEDURE — 3078F PR MOST RECENT DIASTOLIC BLOOD PRESSURE < 80 MM HG: ICD-10-PCS | Mod: CPTII,S$GLB,, | Performed by: SURGERY

## 2022-06-23 PROCEDURE — 3074F SYST BP LT 130 MM HG: CPT | Mod: CPTII,S$GLB,, | Performed by: SURGERY

## 2022-06-23 PROCEDURE — 1160F PR REVIEW ALL MEDS BY PRESCRIBER/CLIN PHARMACIST DOCUMENTED: ICD-10-PCS | Mod: CPTII,S$GLB,, | Performed by: SURGERY

## 2022-06-23 PROCEDURE — 3078F DIAST BP <80 MM HG: CPT | Mod: CPTII,S$GLB,, | Performed by: SURGERY

## 2022-06-23 PROCEDURE — 99999 PR PBB SHADOW E&M-EST. PATIENT-LVL III: ICD-10-PCS | Mod: PBBFAC,,, | Performed by: SURGERY

## 2022-06-23 PROCEDURE — 1160F RVW MEDS BY RX/DR IN RCRD: CPT | Mod: CPTII,S$GLB,, | Performed by: SURGERY

## 2022-06-23 PROCEDURE — 99999 PR PBB SHADOW E&M-EST. PATIENT-LVL III: CPT | Mod: PBBFAC,,, | Performed by: SURGERY

## 2022-06-23 RX ORDER — METHOCARBAMOL 750 MG/1
TABLET, FILM COATED ORAL
COMMUNITY
Start: 2022-02-22 | End: 2023-01-11 | Stop reason: SDUPTHER

## 2022-06-23 RX ORDER — GABAPENTIN 300 MG/1
300 CAPSULE ORAL NIGHTLY
COMMUNITY
Start: 2022-04-18 | End: 2023-01-11

## 2022-06-23 RX ORDER — MELOXICAM 15 MG/1
TABLET ORAL
COMMUNITY
Start: 2022-04-18 | End: 2023-01-11 | Stop reason: SDUPTHER

## 2022-06-23 RX ORDER — ROSUVASTATIN CALCIUM 10 MG/1
10 TABLET, COATED ORAL NIGHTLY
COMMUNITY
Start: 2022-06-19 | End: 2023-01-11

## 2022-06-23 NOTE — PATIENT INSTRUCTIONS
I do not detect a right or left inguinal hernia.    If you ever notice a bulge in the right or left groin or have a right and left groin discomfort that persists for more than a few days please let us know.    Our office phone numbers are  [phone] and     I have seen added some information here about inguinal hernias

## 2022-06-23 NOTE — PROGRESS NOTES
Subjective:       Patient ID: Zane Allison is a 56 y.o. male.    Follow-up for evaluation of an inguinal hernia    Chief Complaint: Follow-up (6 month f/u, hernia)    Patient presents now for 6 month follow-up.  He was seen previously at the request of his primary care doctor for possible inguinal hernia.  At that time and inguinal hernia was not identified.    He reports no groin pain.  He reports no groin bulges    Review of Systems   Gastrointestinal:        No groin pain or bulge       Objective:      Physical Exam  Constitutional:       Appearance: He is normal weight.   Cardiovascular:      Rate and Rhythm: Normal rate and regular rhythm.      Heart sounds: Normal heart sounds.   Abdominal:      Hernia: No hernia is present.      Comments: No evidence of a right or left inguinal hernia   Neurological:      Mental Status: He is alert.         Assessment:      no evidence of a right or left inguinal hernia  Plan:         No need for any surgical intervention at this time.  The patient was given a pamphlet on inguinal hernias.    We will see him back as needed

## 2022-11-09 ENCOUNTER — OFFICE VISIT (OUTPATIENT)
Dept: INTERNAL MEDICINE | Facility: CLINIC | Age: 57
End: 2022-11-09
Payer: COMMERCIAL

## 2022-11-09 VITALS
OXYGEN SATURATION: 96 % | BODY MASS INDEX: 25.99 KG/M2 | HEIGHT: 71 IN | DIASTOLIC BLOOD PRESSURE: 70 MMHG | HEART RATE: 73 BPM | WEIGHT: 185.63 LBS | SYSTOLIC BLOOD PRESSURE: 125 MMHG

## 2022-11-09 DIAGNOSIS — B35.3 TINEA PEDIS OF LEFT FOOT: Primary | ICD-10-CM

## 2022-11-09 DIAGNOSIS — I10 HYPERTENSION: ICD-10-CM

## 2022-11-09 PROCEDURE — 3008F BODY MASS INDEX DOCD: CPT | Mod: CPTII,S$GLB,, | Performed by: INTERNAL MEDICINE

## 2022-11-09 PROCEDURE — 1160F PR REVIEW ALL MEDS BY PRESCRIBER/CLIN PHARMACIST DOCUMENTED: ICD-10-PCS | Mod: CPTII,S$GLB,, | Performed by: INTERNAL MEDICINE

## 2022-11-09 PROCEDURE — 1159F MED LIST DOCD IN RCRD: CPT | Mod: CPTII,S$GLB,, | Performed by: INTERNAL MEDICINE

## 2022-11-09 PROCEDURE — 3074F SYST BP LT 130 MM HG: CPT | Mod: CPTII,S$GLB,, | Performed by: INTERNAL MEDICINE

## 2022-11-09 PROCEDURE — 99999 PR PBB SHADOW E&M-EST. PATIENT-LVL III: CPT | Mod: PBBFAC,,, | Performed by: INTERNAL MEDICINE

## 2022-11-09 PROCEDURE — 3008F PR BODY MASS INDEX (BMI) DOCUMENTED: ICD-10-PCS | Mod: CPTII,S$GLB,, | Performed by: INTERNAL MEDICINE

## 2022-11-09 PROCEDURE — 3078F PR MOST RECENT DIASTOLIC BLOOD PRESSURE < 80 MM HG: ICD-10-PCS | Mod: CPTII,S$GLB,, | Performed by: INTERNAL MEDICINE

## 2022-11-09 PROCEDURE — 99999 PR PBB SHADOW E&M-EST. PATIENT-LVL III: ICD-10-PCS | Mod: PBBFAC,,, | Performed by: INTERNAL MEDICINE

## 2022-11-09 PROCEDURE — 1160F RVW MEDS BY RX/DR IN RCRD: CPT | Mod: CPTII,S$GLB,, | Performed by: INTERNAL MEDICINE

## 2022-11-09 PROCEDURE — 99213 OFFICE O/P EST LOW 20 MIN: CPT | Mod: S$GLB,,, | Performed by: INTERNAL MEDICINE

## 2022-11-09 PROCEDURE — 3078F DIAST BP <80 MM HG: CPT | Mod: CPTII,S$GLB,, | Performed by: INTERNAL MEDICINE

## 2022-11-09 PROCEDURE — 3074F PR MOST RECENT SYSTOLIC BLOOD PRESSURE < 130 MM HG: ICD-10-PCS | Mod: CPTII,S$GLB,, | Performed by: INTERNAL MEDICINE

## 2022-11-09 PROCEDURE — 1159F PR MEDICATION LIST DOCUMENTED IN MEDICAL RECORD: ICD-10-PCS | Mod: CPTII,S$GLB,, | Performed by: INTERNAL MEDICINE

## 2022-11-09 PROCEDURE — 99213 PR OFFICE/OUTPT VISIT, EST, LEVL III, 20-29 MIN: ICD-10-PCS | Mod: S$GLB,,, | Performed by: INTERNAL MEDICINE

## 2022-11-09 NOTE — PROGRESS NOTES
"HPI:  Patient is a 57-year-old man who complains of occasional cracking between the 4th and 5th and 3rd and 4th toes of the left foot.    Current meds have been verified and updated per the EMR  Exam:/70 (BP Location: Left arm, Patient Position: Sitting, BP Method: Large (Manual))   Pulse 73   Ht 5' 11" (1.803 m)   Wt 84.2 kg (185 lb 10 oz)   SpO2 96%   BMI 25.89 kg/m²   He has classical athlete's foot between the 3rd and 4th and 4th and 5th digits of the left foot    Lab Results   Component Value Date    WBC 6.14 11/05/2021    HGB 15.0 11/05/2021    HCT 48.6 11/05/2021     11/05/2021    CHOL 172 11/05/2021    TRIG 102 11/05/2021    HDL 41 11/05/2021    ALT 17 11/05/2021    AST 16 11/05/2021     11/05/2021    K 4.4 11/05/2021     11/05/2021    CREATININE 1.1 11/05/2021    BUN 13 11/05/2021    CO2 27 11/05/2021    TSH 0.615 11/05/2021    PSA 0.58 11/05/2021     Impression:  Athlete's foot  Patient Active Problem List   Diagnosis    History of colon polyps    Hypertension       Plan:     He was told to get over-the-counter Lotrimin cream applied 3 times a day.  He was instructed to keep the feet extremely dry.  He should also use Desitin powder.    This note is generated with speech recognition software and is subject to transcription error and sound alike phrases that may be missed by proofreading.        "

## 2023-01-11 ENCOUNTER — OFFICE VISIT (OUTPATIENT)
Dept: INTERNAL MEDICINE | Facility: CLINIC | Age: 58
End: 2023-01-11
Payer: COMMERCIAL

## 2023-01-11 ENCOUNTER — LAB VISIT (OUTPATIENT)
Dept: LAB | Facility: HOSPITAL | Age: 58
End: 2023-01-11
Attending: INTERNAL MEDICINE
Payer: COMMERCIAL

## 2023-01-11 VITALS
BODY MASS INDEX: 26.02 KG/M2 | TEMPERATURE: 98 F | OXYGEN SATURATION: 96 % | HEIGHT: 71 IN | WEIGHT: 185.88 LBS | HEART RATE: 71 BPM | SYSTOLIC BLOOD PRESSURE: 132 MMHG | DIASTOLIC BLOOD PRESSURE: 78 MMHG

## 2023-01-11 DIAGNOSIS — Z86.010 HISTORY OF COLON POLYPS: ICD-10-CM

## 2023-01-11 DIAGNOSIS — Z00.00 ROUTINE GENERAL MEDICAL EXAMINATION AT A HEALTH CARE FACILITY: Primary | ICD-10-CM

## 2023-01-11 DIAGNOSIS — I10 PRIMARY HYPERTENSION: ICD-10-CM

## 2023-01-11 DIAGNOSIS — Z12.5 SCREENING FOR PROSTATE CANCER: ICD-10-CM

## 2023-01-11 LAB
ALBUMIN SERPL BCP-MCNC: 3.9 G/DL (ref 3.5–5.2)
ALP SERPL-CCNC: 57 U/L (ref 55–135)
ALT SERPL W/O P-5'-P-CCNC: 21 U/L (ref 10–44)
ANION GAP SERPL CALC-SCNC: 10 MMOL/L (ref 8–16)
AST SERPL-CCNC: 18 U/L (ref 10–40)
BILIRUB SERPL-MCNC: 0.5 MG/DL (ref 0.1–1)
BUN SERPL-MCNC: 15 MG/DL (ref 6–20)
CALCIUM SERPL-MCNC: 9.8 MG/DL (ref 8.7–10.5)
CHLORIDE SERPL-SCNC: 107 MMOL/L (ref 95–110)
CHOLEST SERPL-MCNC: 190 MG/DL (ref 120–199)
CHOLEST/HDLC SERPL: 4.5 {RATIO} (ref 2–5)
CO2 SERPL-SCNC: 25 MMOL/L (ref 23–29)
COMPLEXED PSA SERPL-MCNC: 0.54 NG/ML (ref 0–4)
CREAT SERPL-MCNC: 1.2 MG/DL (ref 0.5–1.4)
EST. GFR  (NO RACE VARIABLE): >60 ML/MIN/1.73 M^2
GLUCOSE SERPL-MCNC: 83 MG/DL (ref 70–110)
HDLC SERPL-MCNC: 42 MG/DL (ref 40–75)
HDLC SERPL: 22.1 % (ref 20–50)
LDLC SERPL CALC-MCNC: 122.8 MG/DL (ref 63–159)
NONHDLC SERPL-MCNC: 148 MG/DL
POTASSIUM SERPL-SCNC: 4 MMOL/L (ref 3.5–5.1)
PROT SERPL-MCNC: 7.5 G/DL (ref 6–8.4)
SODIUM SERPL-SCNC: 142 MMOL/L (ref 136–145)
TRIGL SERPL-MCNC: 126 MG/DL (ref 30–150)
TSH SERPL DL<=0.005 MIU/L-ACNC: 0.8 UIU/ML (ref 0.4–4)

## 2023-01-11 PROCEDURE — 99396 PREV VISIT EST AGE 40-64: CPT | Mod: S$GLB,,, | Performed by: INTERNAL MEDICINE

## 2023-01-11 PROCEDURE — 3078F PR MOST RECENT DIASTOLIC BLOOD PRESSURE < 80 MM HG: ICD-10-PCS | Mod: CPTII,S$GLB,, | Performed by: INTERNAL MEDICINE

## 2023-01-11 PROCEDURE — 99999 PR PBB SHADOW E&M-EST. PATIENT-LVL III: ICD-10-PCS | Mod: PBBFAC,,, | Performed by: INTERNAL MEDICINE

## 2023-01-11 PROCEDURE — 1159F PR MEDICATION LIST DOCUMENTED IN MEDICAL RECORD: ICD-10-PCS | Mod: CPTII,S$GLB,, | Performed by: INTERNAL MEDICINE

## 2023-01-11 PROCEDURE — 3078F DIAST BP <80 MM HG: CPT | Mod: CPTII,S$GLB,, | Performed by: INTERNAL MEDICINE

## 2023-01-11 PROCEDURE — 3075F SYST BP GE 130 - 139MM HG: CPT | Mod: CPTII,S$GLB,, | Performed by: INTERNAL MEDICINE

## 2023-01-11 PROCEDURE — 3008F PR BODY MASS INDEX (BMI) DOCUMENTED: ICD-10-PCS | Mod: CPTII,S$GLB,, | Performed by: INTERNAL MEDICINE

## 2023-01-11 PROCEDURE — 84153 ASSAY OF PSA TOTAL: CPT | Performed by: INTERNAL MEDICINE

## 2023-01-11 PROCEDURE — 1160F RVW MEDS BY RX/DR IN RCRD: CPT | Mod: CPTII,S$GLB,, | Performed by: INTERNAL MEDICINE

## 2023-01-11 PROCEDURE — 80053 COMPREHEN METABOLIC PANEL: CPT | Performed by: INTERNAL MEDICINE

## 2023-01-11 PROCEDURE — 3075F PR MOST RECENT SYSTOLIC BLOOD PRESS GE 130-139MM HG: ICD-10-PCS | Mod: CPTII,S$GLB,, | Performed by: INTERNAL MEDICINE

## 2023-01-11 PROCEDURE — 99999 PR PBB SHADOW E&M-EST. PATIENT-LVL III: CPT | Mod: PBBFAC,,, | Performed by: INTERNAL MEDICINE

## 2023-01-11 PROCEDURE — 80061 LIPID PANEL: CPT | Performed by: INTERNAL MEDICINE

## 2023-01-11 PROCEDURE — 1160F PR REVIEW ALL MEDS BY PRESCRIBER/CLIN PHARMACIST DOCUMENTED: ICD-10-PCS | Mod: CPTII,S$GLB,, | Performed by: INTERNAL MEDICINE

## 2023-01-11 PROCEDURE — 3008F BODY MASS INDEX DOCD: CPT | Mod: CPTII,S$GLB,, | Performed by: INTERNAL MEDICINE

## 2023-01-11 PROCEDURE — 1159F MED LIST DOCD IN RCRD: CPT | Mod: CPTII,S$GLB,, | Performed by: INTERNAL MEDICINE

## 2023-01-11 PROCEDURE — 84443 ASSAY THYROID STIM HORMONE: CPT | Performed by: INTERNAL MEDICINE

## 2023-01-11 PROCEDURE — 36415 COLL VENOUS BLD VENIPUNCTURE: CPT | Mod: PO | Performed by: INTERNAL MEDICINE

## 2023-01-11 PROCEDURE — 99396 PR PREVENTIVE VISIT,EST,40-64: ICD-10-PCS | Mod: S$GLB,,, | Performed by: INTERNAL MEDICINE

## 2023-01-11 RX ORDER — MELOXICAM 15 MG/1
15 TABLET ORAL DAILY
Qty: 90 TABLET | Refills: 3 | Status: SHIPPED | OUTPATIENT
Start: 2023-01-11

## 2023-01-11 RX ORDER — METHOCARBAMOL 750 MG/1
750 TABLET, FILM COATED ORAL 3 TIMES DAILY
Qty: 90 TABLET | Refills: 3 | Status: SHIPPED | OUTPATIENT
Start: 2023-01-11

## 2023-01-11 NOTE — PROGRESS NOTES
"HPI:  Patient is a 57-year-old man who comes today for his annual physical.  He has been off his blood pressure medicines for several months.  His blood pressure home has been doing well off meds.  At this time he only has intermittent problems with lumbar back pain.  He takes meloxicam and Robaxin as needed for that.  He has no other problems or complaints    Current MEDS: medcard review, verified and update  Allergies: Per the electronic medical record    Past Medical History:   Diagnosis Date    History of colon polyps     History of hepatitis C     treated 2016 with complete remission    Hypertension        Past Surgical History:   Procedure Laterality Date    ANKLE SURGERY      COLONOSCOPY N/A 11/1/2017    Procedure: COLONOSCOPY;  Surgeon: Jose Alcocer MD;  Location: [facility];  Service: Endoscopy;  Laterality: N/A;    COLONOSCOPY N/A 12/28/2021    Procedure: COLONOSCOPY;  Surgeon: Evette Sanchez MD;  Location: [facility];  Service: Endoscopy;  Laterality: N/A;    injury      left eye       SHx: per the electronic medical record    FHx: recorded in the electronic medical record    ROS:    denies any chest pains or shortness of breath. Denies any nausea, vomiting or diarrhea. Denies any fever, chills or sweats. Denies any change in weight, voice, stool, skin or hair. Denies any dysuria, dyspepsia or dysphagia. Denies any change in vision, hearing or headaches. Denies any swollen lymph nodes or loss of memory.    PE:  /78   Pulse 71   Temp 97.8 °F (36.6 °C) (Temporal)   Ht 5' 11" (1.803 m)   Wt 84.3 kg (185 lb 13.6 oz)   SpO2 96%   BMI 25.92 kg/m²   Gen: Well-developed, well-nourished, male, in no acute distress, oriented x3  HEENT: neck is supple, no adenopathy, carotids 2+ equal without bruits, thyroid exam normal size without nodules.  CHEST: clear to auscultation and percussion  CVS: regular rate and rhythm without significant murmur, gallop, or rubs  ABD: soft, benign, no rebound no guarding, " no distention.  Bowel sounds are normal.     nontender.  No palpable masses.  No organomegaly and no audible bruits.  RECTAL:  Deferred.  EXT: no clubbing, cyanosis, or edema  LYMPH: no cervical, inguinal, or axillary adenopathy  FEET: no loss of sensation.  No ulcers or pressure sores.  NEURO: gait normal.  Cranial nerves II- XII intact. No nystagmus.  Speech normal.   Gross motor and sensory unremarkable.    Lab Results   Component Value Date    WBC 6.14 11/05/2021    HGB 15.0 11/05/2021    HCT 48.6 11/05/2021     11/05/2021    CHOL 172 11/05/2021    TRIG 102 11/05/2021    HDL 41 11/05/2021    ALT 17 11/05/2021    AST 16 11/05/2021     11/05/2021    K 4.4 11/05/2021     11/05/2021    CREATININE 1.1 11/05/2021    BUN 13 11/05/2021    CO2 27 11/05/2021    TSH 0.615 11/05/2021    PSA 0.58 11/05/2021       Impression:  Stable problems below  Patient Active Problem List   Diagnosis    History of colon polyps    Hypertension       Plan:   Orders Placed This Encounter    Comprehensive Metabolic Panel    Lipid Panel    TSH    PSA, Screening    meloxicam (MOBIC) 15 MG tablet    methocarbamoL (ROBAXIN) 750 MG Tab     Patient told he needs to monitor his blood pressure at least twice per week.  He will have above lab work done today we will call him with results.  He will be seen on yearly basis or otherwise as needed  This note is generated with speech recognition software and is subject to transcription error and sound alike phrases that may be missed by proofreading.

## 2023-01-12 ENCOUNTER — PATIENT MESSAGE (OUTPATIENT)
Dept: INTERNAL MEDICINE | Facility: CLINIC | Age: 58
End: 2023-01-12
Payer: COMMERCIAL

## 2023-01-19 ENCOUNTER — TELEPHONE (OUTPATIENT)
Dept: INTERNAL MEDICINE | Facility: CLINIC | Age: 58
End: 2023-01-19
Payer: COMMERCIAL

## 2023-01-19 NOTE — TELEPHONE ENCOUNTER
Called patient educated Your lab work was all normal. See me again in one year. patient stated okay

## 2023-01-19 NOTE — TELEPHONE ENCOUNTER
----- Message from Mitesh Bains sent at 1/19/2023 11:51 AM CST -----  Contact: self  Pls call pt back with lab results . [phone]   Thank you

## 2023-05-17 NOTE — TELEPHONE ENCOUNTER
Pt needs appt/ called to book his appt, he said he will have to call back after he looks at his schedule.

## 2023-08-21 ENCOUNTER — OFFICE VISIT (OUTPATIENT)
Dept: OPHTHALMOLOGY | Facility: CLINIC | Age: 58
End: 2023-08-21
Payer: COMMERCIAL

## 2023-08-21 ENCOUNTER — PATIENT MESSAGE (OUTPATIENT)
Dept: OPHTHALMOLOGY | Facility: CLINIC | Age: 58
End: 2023-08-21

## 2023-08-21 DIAGNOSIS — H25.13 CATARACT, NUCLEAR SCLEROTIC SENILE, BILATERAL: ICD-10-CM

## 2023-08-21 DIAGNOSIS — H52.7 REFRACTIVE ERRORS: ICD-10-CM

## 2023-08-21 DIAGNOSIS — H11.153 PINGUECULA, BILATERAL: Primary | ICD-10-CM

## 2023-08-21 PROCEDURE — 1159F PR MEDICATION LIST DOCUMENTED IN MEDICAL RECORD: ICD-10-PCS | Mod: CPTII,S$GLB,, | Performed by: OPTOMETRIST

## 2023-08-21 PROCEDURE — 99999 PR PBB SHADOW E&M-EST. PATIENT-LVL II: ICD-10-PCS | Mod: PBBFAC,,, | Performed by: OPTOMETRIST

## 2023-08-21 PROCEDURE — 92015 PR REFRACTION: ICD-10-PCS | Mod: S$GLB,,, | Performed by: OPTOMETRIST

## 2023-08-21 PROCEDURE — 92014 COMPRE OPH EXAM EST PT 1/>: CPT | Mod: S$GLB,,, | Performed by: OPTOMETRIST

## 2023-08-21 PROCEDURE — 92015 DETERMINE REFRACTIVE STATE: CPT | Mod: S$GLB,,, | Performed by: OPTOMETRIST

## 2023-08-21 PROCEDURE — 92014 PR EYE EXAM, EST PATIENT,COMPREHESV: ICD-10-PCS | Mod: S$GLB,,, | Performed by: OPTOMETRIST

## 2023-08-21 PROCEDURE — 1159F MED LIST DOCD IN RCRD: CPT | Mod: CPTII,S$GLB,, | Performed by: OPTOMETRIST

## 2023-08-21 PROCEDURE — 99999 PR PBB SHADOW E&M-EST. PATIENT-LVL II: CPT | Mod: PBBFAC,,, | Performed by: OPTOMETRIST

## 2023-08-21 NOTE — PROGRESS NOTES
HPI    Decrease near and distance visual acuity with glasses.  Last eye exam 02/05/2021 TRF.  Patient using OTC Readers +2.75  Update glasses RX  Last edited by Keely Blanton MA on 8/21/2023 10:35 AM.            Assessment /Plan     For exam results, see Encounter Report.    Pinguecula, bilateral    Cataract, nuclear sclerotic senile, bilateral    Refractive errors      Moderate cataracts OU, not surgical  Follow annually.    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  RTC 1 year  Discussed above and answered questions.

## 2023-08-29 RX ORDER — LISINOPRIL AND HYDROCHLOROTHIAZIDE 10; 12.5 MG/1; MG/1
1 TABLET ORAL DAILY
Qty: 90 TABLET | Refills: 0 | OUTPATIENT
Start: 2023-08-29 | End: 2024-08-28

## 2023-08-29 NOTE — TELEPHONE ENCOUNTER
No care due was identified.  [facility] Embedded Care Due Messages. Reference number: [id number].   8/29/2023 9:33:27 AM CDT

## 2023-08-29 NOTE — TELEPHONE ENCOUNTER
Refill Decision Note   Zane Allison  is requesting a refill authorization.  Brief Assessment and Rationale for Refill:  Quick Discontinue     Medication Therapy Plan:  The original prescription was discontinued on 1/11/2023 by López Escalona MD. Renewing this prescription may not be appropriate.      Comments:     Note composed:1:36 PM 08/29/2023

## 2024-02-01 ENCOUNTER — PATIENT OUTREACH (OUTPATIENT)
Dept: ADMINISTRATIVE | Facility: HOSPITAL | Age: 59
End: 2024-02-01
Payer: COMMERCIAL

## 2024-02-28 DIAGNOSIS — H10.811 PINGUECULITIS OF RIGHT EYE: Primary | ICD-10-CM

## 2024-02-28 RX ORDER — PREDNISOLONE ACETATE 10 MG/ML
SUSPENSION/ DROPS OPHTHALMIC
Qty: 5 ML | Refills: 0 | Status: SHIPPED | OUTPATIENT
Start: 2024-02-28 | End: 2024-03-09

## 2024-02-28 NOTE — PROGRESS NOTES
SUBJECTIVE  Zane Allison is 58 y.o. male  Visual acuity was not recorded.   No chief complaint on file.              Assessment /Plan :  1. Pingueculitis of right eye    Mild pingueculitis OD  Try PF x 10 days  RTC if symptoms fail to resolve.

## 2024-05-13 ENCOUNTER — TELEPHONE (OUTPATIENT)
Dept: INTERNAL MEDICINE | Facility: CLINIC | Age: 59
End: 2024-05-13
Payer: COMMERCIAL

## 2024-05-13 NOTE — TELEPHONE ENCOUNTER
----- Message from Karla Raygoza sent at 5/13/2024  4:26 PM CDT -----  Type:  Patient Returning Call    Who Called:pt   Who Left Message for Patient:Nerizoë   Does the patient know what this is regarding?:prescriptions   Would the patient rather a call back or a response via [facility]? Call   Best Call Back Number:   Additional Information:

## 2024-05-13 NOTE — TELEPHONE ENCOUNTER
Patient informed Dr Escalona can not approve a new script without being seen.  Patient has an appt set for Wednesday at 11:20a.m Verbally understands.

## 2024-05-15 ENCOUNTER — OFFICE VISIT (OUTPATIENT)
Dept: INTERNAL MEDICINE | Facility: CLINIC | Age: 59
End: 2024-05-15
Payer: COMMERCIAL

## 2024-05-15 DIAGNOSIS — Z86.010 HISTORY OF COLON POLYPS: ICD-10-CM

## 2024-05-15 DIAGNOSIS — I10 PRIMARY HYPERTENSION: Primary | ICD-10-CM

## 2024-05-15 PROCEDURE — 99213 OFFICE O/P EST LOW 20 MIN: CPT | Mod: 95,,, | Performed by: INTERNAL MEDICINE

## 2024-05-15 PROCEDURE — 1160F RVW MEDS BY RX/DR IN RCRD: CPT | Mod: CPTII,95,, | Performed by: INTERNAL MEDICINE

## 2024-05-15 PROCEDURE — 1159F MED LIST DOCD IN RCRD: CPT | Mod: CPTII,95,, | Performed by: INTERNAL MEDICINE

## 2024-05-15 NOTE — PROGRESS NOTES
The patient location is: home  The chief complaint leading to consultation is: dizziness  Visit type: Virtual visit with synchronous audio and video  Total time spent with patient: 7 min  Each patient to whom he or she provides medical services by telemedicine is:  (1) informed of the relationship between the physician and patient and the respective role of any other health care provider with respect to management of the patient; and (2) notified that he or she may decline to receive medical services by telemedicine and may withdraw from such care at any time.     HPI:  Patient is a 58-year-old gentleman who I have not seen in about 16 months who has been seeing another physician at the [facility] with notes that currently in epic who makes an appointment to see me for dizziness and also wanting prescription for Ambien to help him sleep.  Explained to the patient that he needs to address these with his primary care physician.  If he like to make me his primary care physician again he needs to make an appointment to come in to be seen.  He needs to be examined for these complaints today.  Towards the end of our visit we lost connection with the video and audio.  I had my nurse call him to give him the above information.  He will schedule an appointment  Current meds have been verified and updated per the EMR  Exam:  He looks healthy.  No signs of distress    Lab Results   Component Value Date    WBC 6.14 11/05/2021    HGB 15.0 11/05/2021    HCT 48.6 11/05/2021     11/05/2021    CHOL 190 01/11/2023    TRIG 126 01/11/2023    HDL 42 01/11/2023    ALT 21 01/11/2023    AST 18 01/11/2023     01/11/2023    K 4.0 01/11/2023     01/11/2023    CREATININE 1.2 01/11/2023    BUN 15 01/11/2023    CO2 25 01/11/2023    TSH 0.797 01/11/2023    PSA 0.54 01/11/2023    HGBA1C 4.8 02/25/2022       Impression:    Patient Active Problem List   Diagnosis    History of colon polyps    Hypertension       Plan:      Patient will schedule appointment to be seen.    This note is generated with speech recognition software and is subject to transcription error and sound alike phrases that may be missed by proofreading.

## 2024-09-18 ENCOUNTER — TELEPHONE (OUTPATIENT)
Dept: INTERNAL MEDICINE | Facility: CLINIC | Age: 59
End: 2024-09-18
Payer: COMMERCIAL

## 2024-09-18 NOTE — TELEPHONE ENCOUNTER
----- Message from Reji Allison sent at 9/18/2024  1:28 PM CDT -----  Contact: [phone]  Type:  Sooner Apoointment Request    Caller is requesting a sooner appointment.  Caller declined first available appointment listed below.  Caller will not accept being placed on the waitlist and is requesting a message be sent to doctor.  Name of Caller:MAJO ALLISON [[id number]]  When is the first available appointment?10/28....need to be sooner   Symptoms:CHECK UP/ PROSTATE CHECKED  Would the patient rather a call back or a response via [facility]? Call back  Best Call Back Number: [phone]  Additional Information: [id number]

## 2024-09-18 NOTE — TELEPHONE ENCOUNTER
Spoke with pt, he stated he will call back to schedule shortly-- currently busy at work and unable to look at his work schedule

## 2024-10-09 ENCOUNTER — OFFICE VISIT (OUTPATIENT)
Dept: INTERNAL MEDICINE | Facility: CLINIC | Age: 59
End: 2024-10-09
Payer: COMMERCIAL

## 2024-10-09 ENCOUNTER — TELEPHONE (OUTPATIENT)
Dept: INTERNAL MEDICINE | Facility: CLINIC | Age: 59
End: 2024-10-09

## 2024-10-09 VITALS
DIASTOLIC BLOOD PRESSURE: 64 MMHG | WEIGHT: 183.63 LBS | TEMPERATURE: 96 F | BODY MASS INDEX: 25.61 KG/M2 | OXYGEN SATURATION: 96 % | SYSTOLIC BLOOD PRESSURE: 122 MMHG | HEART RATE: 75 BPM

## 2024-10-09 DIAGNOSIS — F32.1 CURRENT MODERATE EPISODE OF MAJOR DEPRESSIVE DISORDER WITHOUT PRIOR EPISODE: ICD-10-CM

## 2024-10-09 DIAGNOSIS — Z00.00 ROUTINE GENERAL MEDICAL EXAMINATION AT A HEALTH CARE FACILITY: Primary | ICD-10-CM

## 2024-10-09 DIAGNOSIS — I10 PRIMARY HYPERTENSION: ICD-10-CM

## 2024-10-09 DIAGNOSIS — Z86.0100 HISTORY OF COLON POLYPS: ICD-10-CM

## 2024-10-09 DIAGNOSIS — Z12.5 PROSTATE CANCER SCREENING: ICD-10-CM

## 2024-10-09 PROCEDURE — 1160F RVW MEDS BY RX/DR IN RCRD: CPT | Mod: CPTII,S$GLB,, | Performed by: INTERNAL MEDICINE

## 2024-10-09 PROCEDURE — 1159F MED LIST DOCD IN RCRD: CPT | Mod: CPTII,S$GLB,, | Performed by: INTERNAL MEDICINE

## 2024-10-09 PROCEDURE — 3074F SYST BP LT 130 MM HG: CPT | Mod: CPTII,S$GLB,, | Performed by: INTERNAL MEDICINE

## 2024-10-09 PROCEDURE — 99396 PREV VISIT EST AGE 40-64: CPT | Mod: S$GLB,,, | Performed by: INTERNAL MEDICINE

## 2024-10-09 PROCEDURE — 4010F ACE/ARB THERAPY RXD/TAKEN: CPT | Mod: CPTII,S$GLB,, | Performed by: INTERNAL MEDICINE

## 2024-10-09 PROCEDURE — 3078F DIAST BP <80 MM HG: CPT | Mod: CPTII,S$GLB,, | Performed by: INTERNAL MEDICINE

## 2024-10-09 PROCEDURE — 3008F BODY MASS INDEX DOCD: CPT | Mod: CPTII,S$GLB,, | Performed by: INTERNAL MEDICINE

## 2024-10-09 PROCEDURE — 99999 PR PBB SHADOW E&M-EST. PATIENT-LVL IV: CPT | Mod: PBBFAC,,, | Performed by: INTERNAL MEDICINE

## 2024-10-09 RX ORDER — ESCITALOPRAM OXALATE 10 MG/1
10 TABLET ORAL DAILY
Qty: 90 TABLET | Refills: 3 | Status: SHIPPED | OUTPATIENT
Start: 2024-10-09 | End: 2025-10-09

## 2024-10-09 RX ORDER — LISINOPRIL 10 MG/1
10 TABLET ORAL EVERY MORNING
Qty: 90 TABLET | Refills: 3 | Status: SHIPPED | OUTPATIENT
Start: 2024-10-09

## 2024-10-09 RX ORDER — LISINOPRIL 10 MG/1
10 TABLET ORAL EVERY MORNING
COMMUNITY
Start: 2024-06-05 | End: 2024-10-09 | Stop reason: SDUPTHER

## 2024-10-09 RX ORDER — METOPROLOL SUCCINATE 25 MG/1
25 TABLET, EXTENDED RELEASE ORAL EVERY MORNING
Qty: 90 TABLET | Refills: 3 | Status: SHIPPED | OUTPATIENT
Start: 2024-10-09

## 2024-10-09 RX ORDER — METOPROLOL SUCCINATE 25 MG/1
25 TABLET, EXTENDED RELEASE ORAL EVERY MORNING
COMMUNITY
Start: 2024-06-20 | End: 2024-10-09 | Stop reason: SDUPTHER

## 2024-10-09 NOTE — PROGRESS NOTES
HPI:  Patient is a 59-year-old gentleman who comes in today for physical examination.  He states he just feels terrible the last several months.  He is fatigued a lot.  He is having trouble with concentration.  He has intermittent episodes of just feeling dizzy and lightheaded.  These can last for hours at a time.  He denies any problems with doing his work.  He states he is under lot of stress with family and work.  He declined to go into details  He does not check his blood pressure at home      Current MEDS: medcard review, verified and update  Allergies: Per the electronic medical record    Past Medical History:   Diagnosis Date    History of colon polyps     History of hepatitis C     treated 2016 with complete remission    Hypertension     Major depressive disorder, single episode, unspecified        Past Surgical History:   Procedure Laterality Date    ANKLE SURGERY      COLONOSCOPY N/A 11/1/2017    Procedure: COLONOSCOPY;  Surgeon: Jose Alcocer MD;  Location: [facility];  Service: Endoscopy;  Laterality: N/A;    COLONOSCOPY N/A 12/28/2021    Procedure: COLONOSCOPY;  Surgeon: Evette Sanchez MD;  Location: [facility];  Service: Endoscopy;  Laterality: N/A;    injury      left eye       SHx: per the electronic medical record    FHx: recorded in the electronic medical record    ROS:    denies any chest pains or shortness of breath. Denies any nausea, vomiting or diarrhea. Denies any fever, chills or sweats. Denies any change in weight, voice, stool, skin or hair. Denies any dysuria, dyspepsia or dysphagia. Denies any change in vision, hearing or headaches. Denies any swollen lymph nodes or loss of memory.    PE:  /64 (BP Location: Left arm, Patient Position: Sitting)   Pulse 75   Temp 96.1 °F (35.6 °C) (Tympanic)   Wt 83.3 kg (183 lb 10.3 oz)   SpO2 96%   BMI 25.61 kg/m²   Gen: Well-developed, well-nourished, male, in no acute distress, oriented x3  HEENT: neck is supple, no adenopathy, carotids 2+  equal without bruits, thyroid exam normal size without nodules.  CHEST: clear to auscultation and percussion  CVS: regular rate and rhythm without significant murmur, gallop, or rubs  ABD: soft, benign, no rebound no guarding, no distention.  Bowel sounds are normal.     nontender.  No palpable masses.  No organomegaly and no audible bruits.  RECTAL:  Deferred.  EXT: no clubbing, cyanosis, or edema  LYMPH: no cervical, inguinal, or axillary adenopathy  FEET: no loss of sensation.  No ulcers or pressure sores.  NEURO: gait normal.  Cranial nerves II- XII intact. No nystagmus.  Speech normal.   Gross motor and sensory unremarkable.    Lab Results   Component Value Date    WBC 4.8 06/05/2024    HGB 15.8 06/05/2024    HCT 49.3 06/05/2024     06/05/2024    CHOL 190 01/11/2023    TRIG 126 01/11/2023    HDL 42 01/11/2023    ALT 21 01/11/2023    AST 18 01/11/2023     01/11/2023    K 4.0 01/11/2023     01/11/2023    CREATININE 1.2 01/11/2023    BUN 15 01/11/2023    CO2 25 01/11/2023    TSH 0.797 01/11/2023    PSA 0.54 01/11/2023    HGBA1C 4.8 02/25/2022    BNP 54 12/03/2020       Impression:  Major depressive disorder, active, will start on Lexapro today  Hypertension, controlled  History of colon polyps, due for repeat colonoscopy  Patient Active Problem List   Diagnosis    History of colon polyps    Hypertension    Major depressive disorder, single episode, unspecified       Plan:   Orders Placed This Encounter    Comprehensive Metabolic Panel    Lipid Panel    TSH    PSA, Screening    Ambulatory referral/consult to Endo Procedure     metoprolol succinate (TOPROL-XL) 25 MG 24 hr tablet    lisinopriL 10 MG tablet    EScitalopram oxalate (LEXAPRO) 10 MG tablet     He will have the above lab work done this week.  He will have the colonoscopy set up.  He was started on Lexapro.  He will see me back in 4-6 weeks.  This note is generated with speech recognition software and is subject to transcription  error and sound alike phrases that may be missed by proofreading.

## 2024-10-09 NOTE — TELEPHONE ENCOUNTER
----- Message from Arnaldo sent at 10/9/2024 10:29 AM CDT -----  Contact: Zane  Type:  Patient Returning Call    Who Called:Zane  Who Left Message for Patient:sarah  Does the patient know what this is regarding?:missed call  Would the patient rather a call back or a response via [facility]? call  Best Call Back Number:[phone]   Additional Information:

## 2024-10-10 ENCOUNTER — LAB VISIT (OUTPATIENT)
Dept: LAB | Facility: HOSPITAL | Age: 59
End: 2024-10-10
Attending: INTERNAL MEDICINE
Payer: COMMERCIAL

## 2024-10-10 DIAGNOSIS — I10 PRIMARY HYPERTENSION: ICD-10-CM

## 2024-10-10 DIAGNOSIS — Z12.5 PROSTATE CANCER SCREENING: ICD-10-CM

## 2024-10-10 LAB
ALBUMIN SERPL BCP-MCNC: 3.9 G/DL (ref 3.5–5.2)
ALP SERPL-CCNC: 59 U/L (ref 55–135)
ALT SERPL W/O P-5'-P-CCNC: 22 U/L (ref 10–44)
ANION GAP SERPL CALC-SCNC: 10 MMOL/L (ref 8–16)
AST SERPL-CCNC: 22 U/L (ref 10–40)
BILIRUB SERPL-MCNC: 0.7 MG/DL (ref 0.1–1)
BUN SERPL-MCNC: 18 MG/DL (ref 6–20)
CALCIUM SERPL-MCNC: 9.7 MG/DL (ref 8.7–10.5)
CHLORIDE SERPL-SCNC: 106 MMOL/L (ref 95–110)
CHOLEST SERPL-MCNC: 185 MG/DL (ref 120–199)
CHOLEST/HDLC SERPL: 4.4 {RATIO} (ref 2–5)
CO2 SERPL-SCNC: 28 MMOL/L (ref 23–29)
COMPLEXED PSA SERPL-MCNC: 0.66 NG/ML (ref 0–4)
CREAT SERPL-MCNC: 1.4 MG/DL (ref 0.5–1.4)
EST. GFR  (NO RACE VARIABLE): 58.6 ML/MIN/1.73 M^2
GLUCOSE SERPL-MCNC: 83 MG/DL (ref 70–110)
HDLC SERPL-MCNC: 42 MG/DL (ref 40–75)
HDLC SERPL: 22.7 % (ref 20–50)
LDLC SERPL CALC-MCNC: 123.4 MG/DL (ref 63–159)
NONHDLC SERPL-MCNC: 143 MG/DL
POTASSIUM SERPL-SCNC: 4.9 MMOL/L (ref 3.5–5.1)
PROT SERPL-MCNC: 7.7 G/DL (ref 6–8.4)
SODIUM SERPL-SCNC: 144 MMOL/L (ref 136–145)
TRIGL SERPL-MCNC: 98 MG/DL (ref 30–150)
TSH SERPL DL<=0.005 MIU/L-ACNC: 0.54 UIU/ML (ref 0.4–4)

## 2024-10-10 PROCEDURE — 80061 LIPID PANEL: CPT | Performed by: INTERNAL MEDICINE

## 2024-10-10 PROCEDURE — 36415 COLL VENOUS BLD VENIPUNCTURE: CPT | Mod: PO | Performed by: INTERNAL MEDICINE

## 2024-10-10 PROCEDURE — 84153 ASSAY OF PSA TOTAL: CPT | Performed by: INTERNAL MEDICINE

## 2024-10-10 PROCEDURE — 84443 ASSAY THYROID STIM HORMONE: CPT | Performed by: INTERNAL MEDICINE

## 2024-10-10 PROCEDURE — 80053 COMPREHEN METABOLIC PANEL: CPT | Performed by: INTERNAL MEDICINE

## 2024-10-14 ENCOUNTER — PATIENT MESSAGE (OUTPATIENT)
Dept: INTERNAL MEDICINE | Facility: CLINIC | Age: 59
End: 2024-10-14
Payer: COMMERCIAL

## 2024-10-21 ENCOUNTER — TELEPHONE (OUTPATIENT)
Dept: INTERNAL MEDICINE | Facility: CLINIC | Age: 59
End: 2024-10-21
Payer: COMMERCIAL

## 2024-10-22 ENCOUNTER — TELEPHONE (OUTPATIENT)
Dept: INTERNAL MEDICINE | Facility: CLINIC | Age: 59
End: 2024-10-22
Payer: COMMERCIAL

## 2025-01-24 ENCOUNTER — TELEPHONE (OUTPATIENT)
Dept: INTERNAL MEDICINE | Facility: CLINIC | Age: 60
End: 2025-01-24
Payer: COMMERCIAL

## 2025-01-24 ENCOUNTER — HOSPITAL ENCOUNTER (OUTPATIENT)
Dept: RADIOLOGY | Facility: HOSPITAL | Age: 60
Discharge: HOME | End: 2025-01-24
Payer: COMMERCIAL

## 2025-01-24 ENCOUNTER — OFFICE VISIT (OUTPATIENT)
Dept: INTERNAL MEDICINE | Facility: CLINIC | Age: 60
End: 2025-01-24
Payer: COMMERCIAL

## 2025-01-24 VITALS
TEMPERATURE: 96 F | HEART RATE: 82 BPM | OXYGEN SATURATION: 97 % | BODY MASS INDEX: 25.4 KG/M2 | SYSTOLIC BLOOD PRESSURE: 126 MMHG | DIASTOLIC BLOOD PRESSURE: 78 MMHG | WEIGHT: 182.13 LBS

## 2025-01-24 DIAGNOSIS — R10.9 ACUTE RIGHT FLANK PAIN: ICD-10-CM

## 2025-01-24 DIAGNOSIS — R06.09 DOE (DYSPNEA ON EXERTION): Primary | ICD-10-CM

## 2025-01-24 DIAGNOSIS — Z79.899 OTHER LONG TERM (CURRENT) DRUG THERAPY: ICD-10-CM

## 2025-01-24 DIAGNOSIS — M54.9 PAIN OF MID BACK: ICD-10-CM

## 2025-01-24 DIAGNOSIS — L30.9 PERIANAL DERMATITIS: ICD-10-CM

## 2025-01-24 DIAGNOSIS — R35.0 FREQUENCY OF MICTURITION: ICD-10-CM

## 2025-01-24 DIAGNOSIS — F17.210 CIGARETTE NICOTINE DEPENDENCE WITHOUT COMPLICATION: ICD-10-CM

## 2025-01-24 DIAGNOSIS — I10 PRIMARY HYPERTENSION: ICD-10-CM

## 2025-01-24 PROCEDURE — 72080 X-RAY EXAM THORACOLMB 2/> VW: CPT | Mod: 26,,, | Performed by: RADIOLOGY

## 2025-01-24 PROCEDURE — 99214 OFFICE O/P EST MOD 30 MIN: CPT | Mod: 25,S$GLB,,

## 2025-01-24 PROCEDURE — 99999 PR PBB SHADOW E&M-EST. PATIENT-LVL III: CPT | Mod: PBBFAC,,,

## 2025-01-24 PROCEDURE — 4010F ACE/ARB THERAPY RXD/TAKEN: CPT | Mod: CPTII,S$GLB,,

## 2025-01-24 PROCEDURE — 87086 URINE CULTURE/COLONY COUNT: CPT

## 2025-01-24 PROCEDURE — 72080 X-RAY EXAM THORACOLMB 2/> VW: CPT | Mod: TC,PO

## 2025-01-24 PROCEDURE — 3078F DIAST BP <80 MM HG: CPT | Mod: CPTII,S$GLB,,

## 2025-01-24 PROCEDURE — 3074F SYST BP LT 130 MM HG: CPT | Mod: CPTII,S$GLB,,

## 2025-01-24 PROCEDURE — 1159F MED LIST DOCD IN RCRD: CPT | Mod: CPTII,S$GLB,,

## 2025-01-24 PROCEDURE — 99406 BEHAV CHNG SMOKING 3-10 MIN: CPT | Mod: S$GLB,,,

## 2025-01-24 PROCEDURE — 3008F BODY MASS INDEX DOCD: CPT | Mod: CPTII,S$GLB,,

## 2025-01-24 PROCEDURE — 81001 URINALYSIS AUTO W/SCOPE: CPT

## 2025-01-24 RX ORDER — METOPROLOL SUCCINATE 25 MG/1
25 TABLET, EXTENDED RELEASE ORAL EVERY MORNING
Qty: 90 TABLET | Refills: 3 | Status: SHIPPED | OUTPATIENT
Start: 2025-01-24

## 2025-01-24 RX ORDER — HYDROCORTISONE 1 %
CREAM (GRAM) TOPICAL 2 TIMES DAILY
Qty: 56 G | Refills: 2 | Status: SHIPPED | OUTPATIENT
Start: 2025-01-24

## 2025-01-24 RX ORDER — SULFAMETHOXAZOLE AND TRIMETHOPRIM 800; 160 MG/1; MG/1
1 TABLET ORAL 2 TIMES DAILY
Qty: 20 TABLET | Refills: 0 | Status: SHIPPED | OUTPATIENT
Start: 2025-01-24 | End: 2025-02-03

## 2025-01-24 RX ORDER — LISINOPRIL 10 MG/1
10 TABLET ORAL EVERY MORNING
Qty: 90 TABLET | Refills: 3 | Status: SHIPPED | OUTPATIENT
Start: 2025-01-24

## 2025-01-24 NOTE — TELEPHONE ENCOUNTER
----- Message from Sarita sent at 1/23/2025  2:33 PM CST -----  Contact: pt  Type: Orders Request    What orders/ testing are being requested? lab    Is there a future appointment scheduled for the patient with PCP? yes    When? 1/29    Would you prefer a response via [facility]?  phone    Comments:

## 2025-01-24 NOTE — PROGRESS NOTES
Patient ID: Zane Allison is a 59 y.o. male.    Chief Complaint: Dizziness and Back Pain    History of Present Illness    CHIEF COMPLAINT:  - Mr. Allison presents with lower back pain and dizziness, accompanied by increased urinary frequency and a recurring rash.    HPI:  Mr. Allison reports pain in his lower back on the left side for 2-3 weeks. The pain is localized, does not radiate, and worsens with movement, particularly when bending backwards. He has not taken any medication for the pain and denies any specific incident or activity that precipitated its onset.    Mr. Allison has had dizziness for a few months, with episodes of vertigo requiring him to stop and sometimes sit down. These dizzy spells often occur with postural changes. He denies any blackouts or falls associated with the dizziness.    For the past 3 months, the patient has had increased urinary frequency, which has been worsening. He needs to urinate every 15-30 minutes and sometimes has dysuria. He also reports nocturia 2-3 times per night.    Mr. Allison describes dyspnea on exertion, needing to stop after walking 1-2 blocks. He reports smoking about a pack of cigarettes per week.    A recurring rash in the intergluteal cleft has been present intermittently. The rash lasts for 2-3 days a week, then disappears for about a month before returning. It causes burning and itching, and sometimes develops into a sore. Mr. Allison has not applied any topical treatments for the rash.    Mr. Allison denies any tinnitus associated with his dizziness, hematuria, or hematochezia.      ROS:  General: -fever, -chills, -fatigue, -weight gain, -weight loss  Eyes: -vision changes, -redness, -discharge  ENT: -ear pain, -nasal congestion, -sore throat, -tinnitus  Cardiovascular: -chest pain, -palpitations, -lower extremity edema  Respiratory: -cough, +shortness of breath  Gastrointestinal: -abdominal pain, -nausea, -vomiting, -diarrhea, -constipation, -blood in  stool  Genitourinary: +dysuria, -hematuria, +frequency, +nocturia  Musculoskeletal: -joint pain, -muscle pain, +back pain  Skin: +rash, -lesion  Neurological: -headache, +dizziness, -numbness, -tingling  Psychiatric: -anxiety, -depression, -sleep difficulty         Pmh, Psh, Family Hx, Social Hx updated in Epic Tabs today.         10/9/2024    11:27 AM 1/11/2023     7:54 AM 1/3/2022     7:27 AM 11/5/2021     6:59 AM   Depression Patient Health Questionnaire   Over the last two weeks how often have you been bothered by little interest or pleasure in doing things More than half the days Not at all Not at all Not at all   Over the last two weeks how often have you been bothered by feeling down, depressed or hopeless Several days Not at all Not at all Not at all   PHQ-2 Total Score 3 0 0 0   Over the last two weeks how often have you been bothered by trouble falling or staying asleep, or sleeping too much Nearly every day      Over the last two weeks how often have you been bothered by feeling tired or having little energy Nearly every day      Over the last two weeks how often have you been bothered by a poor appetite or overeating Nearly every day      Over the last two weeks how often have you been bothered by feeling bad about yourself - or that you are a failure or have let yourself or your family down Not at all      Over the last two weeks how often have you been bothered by trouble concentrating on things, such as reading the newspaper or watching television Not at all      Over the last two weeks how often have you been bothered by moving or speaking so slowly that other people could have noticed. Or the opposite - being so fidgety or restless that you have been moving around a lot more than usual. Not at all      Over the last two weeks how often have you been bothered by thoughts that you would be better off dead, or of hurting yourself Not at all      If you checked off any problems, how difficult have these  problems made it for you to do your work, take care of things at home or get along with other people? Not difficult at all      PHQ-9 Score 12      PHQ-9 Interpretation Moderate          Active Problem List with Overview Notes    Diagnosis Date Noted    Major depressive disorder, single episode, unspecified     History of colon polyps 2022    Hypertension 2022       Past Medical History:   Diagnosis Date    History of colon polyps     History of hepatitis C     treated  with complete remission    Hypertension     Major depressive disorder, single episode, unspecified        Past Surgical History:   Procedure Laterality Date    ANKLE SURGERY      COLONOSCOPY N/A 2017    Procedure: COLONOSCOPY;  Surgeon: Jose Alcocer MD;  Location: [facility] ENDO;  Service: Endoscopy;  Laterality: N/A;    COLONOSCOPY N/A 2021    Procedure: COLONOSCOPY;  Surgeon: Evette Sanchez MD;  Location: [facility] ENDO;  Service: Endoscopy;  Laterality: N/A;    FRACTURE SURGERY  2017    ankle    injury      left eye       Family History   Problem Relation Name Age of Onset    Thyroid disease Mother Aubree Allison     Hypertension Mother Aubree Allison     Alcohol abuse Father Kobi Allison     Hypertension Father Kobi Allison     Diabetes Maternal Aunt      Hypertension Maternal Aunt      Thyroid disease Maternal Aunt      Hypertension Maternal Uncle      Cataracts Maternal Uncle      Alcohol abuse Brother Kobi Allison         Social History     Socioeconomic History    Marital status:    Tobacco Use    Smoking status: Light Smoker     Current packs/day: 0.00     Average packs/day: 1 pack/day for 26.4 years (26.4 ttl pk-yrs)     Types: Cigarettes     Start date: 1993     Last attempt to quit: 2019     Years since quittin.6    Smokeless tobacco: Never   Substance and Sexual Activity    Alcohol use: Yes     Alcohol/week: 1.0 standard drink of alcohol     Types: 1 Cans of beer per week     Comment: once a week     Drug use: No    Sexual activity: Yes     Partners: Female     Birth control/protection: None   Social History Narrative    ** Merged History Encounter **          Social Drivers of Health     Financial Resource Strain: Low Risk  (12/3/2020)    Overall Financial Resource Strain (CARDIA)     Difficulty of Paying Living Expenses: Not hard at all   Food Insecurity: No Food Insecurity (5/15/2024)    Hunger Vital Sign     Worried About Running Out of Food in the Last Year: Never true     Ran Out of Food in the Last Year: Never true   Transportation Needs: No Transportation Needs (5/15/2024)    PRAPARE - Transportation     Lack of Transportation (Medical): No     Lack of Transportation (Non-Medical): No   Physical Activity: Inactive (5/15/2024)    Exercise Vital Sign     Days of Exercise per Week: 0 days     Minutes of Exercise per Session: 0 min   Stress: Stress Concern Present (5/15/2024)    [employer] [place] [employer] - Occupational Stress Questionnaire     Feeling of Stress : To some extent   Housing Stability: Unknown (5/15/2024)    Housing Stability Vital Sign     Unable to Pay for Housing in the Last Year: No       Current Outpatient Medications on File Prior to Visit   Medication Sig Dispense Refill    [DISCONTINUED] EScitalopram oxalate (LEXAPRO) 10 MG tablet Take 1 tablet (10 mg total) by mouth once daily. 90 tablet 3    [DISCONTINUED] lisinopriL 10 MG tablet Take 1 tablet (10 mg total) by mouth every morning. 90 tablet 3    [DISCONTINUED] metoprolol succinate (TOPROL-XL) 25 MG 24 hr tablet Take 1 tablet (25 mg total) by mouth every morning. 90 tablet 3     No current facility-administered medications on file prior to visit.       Review of patient's allergies indicates:  No Known Allergies    General - Well developed, alert and oriented in NAD  HEENT - normocephalic, no evidence of trauma, sclera white, EOMI  Neck - full range of motion  COR - regular rate and rhythm without murmurs or  "gallops  Lungs - Clear  Abdomen - soft, non-tender  Ext - no cyanosis or edema     Assessment:     1. MELGAR (dyspnea on exertion)    2. Pain of mid back    3. Frequency of micturition    4. Acute right flank pain    5. Other long term (current) drug therapy    6. Primary hypertension    7. Perianal dermatitis    8. Cigarette nicotine dependence without complication        Pertinent Labs:    Chemistry        Component Value Date/Time     10/10/2024 1005    K 4.9 10/10/2024 1005     10/10/2024 1005    CO2 28 10/10/2024 1005    BUN 18 10/10/2024 1005    CREATININE 1.4 10/10/2024 1005    GLU 83 10/10/2024 1005        Component Value Date/Time    CALCIUM 9.7 10/10/2024 1005    ALKPHOS 59 10/10/2024 1005    AST 22 10/10/2024 1005    ALT 22 10/10/2024 1005    BILITOT 0.7 10/10/2024 1005    ESTGFRAFRICA >60.0 11/05/2021 0740    EGFRNONAA >60.0 11/05/2021 0740          Lab Results   Component Value Date    WBC 4.8 06/05/2024    HGB 15.8 06/05/2024    HCT 49.3 06/05/2024    MCV 94 11/05/2021    MCH 29.6 06/05/2024    MCHC 30.9 (L) 11/05/2021    RDW 12.9 06/05/2024     06/05/2024    MPV 11.8 11/05/2021    NEUTROABS 2.2 06/05/2024    LYMPHOPCT 47 06/05/2024       Lab Results   Component Value Date    HGBA1C 4.8 02/25/2022    GLU 83 10/10/2024     Lab Results   Component Value Date    LDLCALC 123.4 10/10/2024     Lab Results   Component Value Date    TSH 0.543 10/10/2024     Lab Results   Component Value Date    CHOL 185 10/10/2024    CHOL 190 01/11/2023    CHOL 172 11/05/2021     Lab Results   Component Value Date    TRIG 98 10/10/2024    TRIG 126 01/11/2023    TRIG 102 11/05/2021     Lab Results   Component Value Date    HDL 42 10/10/2024    HDL 42 01/11/2023    HDL 41 11/05/2021     Lab Results   Component Value Date    LDLCALC 123.4 10/10/2024    LDLCALC 122.8 01/11/2023    LDLCALC 110.6 11/05/2021     No results found for: "NONHDLC"  Lab Results   Component Value Date    CHOLHDL 22.7 10/10/2024    CHOLHDL " 22.1 01/11/2023    CHOLHDL 23.8 11/05/2021       The 10-year ASCVD risk score (Idalmis AYALA, et al., 2019) is: 19.7%    Values used to calculate the score:      Age: 59 years      Sex: Male      Is Non- : Yes      Diabetic: No      Tobacco smoker: Yes      Systolic Blood Pressure: 126 mmHg      Is BP treated: Yes      HDL Cholesterol: 42 mg/dL      Total Cholesterol: 185 mg/dL    Plan:   Zane was seen today for dizziness and back pain.    Diagnoses and all orders for this visit:    MELGAR (dyspnea on exertion)  -     Echo; Future  -     Complete PFT w/ bronchodilator; Future    Pain of mid back  -     X-Ray Thoracolumbar Spine AP Lateral; Future    Frequency of micturition  -     sulfamethoxazole-trimethoprim 800-160mg (BACTRIM DS) 800-160 mg Tab; Take 1 tablet by mouth 2 (two) times daily. for 10 days  -     Urinalysis  -     Urine Culture High Risk    Acute right flank pain  -     sulfamethoxazole-trimethoprim 800-160mg (BACTRIM DS) 800-160 mg Tab; Take 1 tablet by mouth 2 (two) times daily. for 10 days  -     Urinalysis  -     Urine Culture High Risk    Other long term (current) drug therapy  -     CBC Auto Differential; Future  -     COMPREHENSIVE METABOLIC PANEL; Future  -     Hemoglobin A1C; Future  -     Lipid Panel; Future    Primary hypertension  -     COMPREHENSIVE METABOLIC PANEL; Future  -     lisinopriL 10 MG tablet; Take 1 tablet (10 mg total) by mouth every morning.  -     metoprolol succinate (TOPROL-XL) 25 MG 24 hr tablet; Take 1 tablet (25 mg total) by mouth every morning.    Perianal dermatitis  -     hydrocortisone 1 % cream; Apply topically 2 (two) times daily.    Cigarette nicotine dependence without complication        Assessment & Plan    F32.1 Current moderate episode of major depressive disorder without prior episode  M54.59 Other low back pain  R51.9 Headache, unspecified  R06.00 Dyspnea, unspecified  F17.210 Nicotine dependence, cigarettes, uncomplicated  N39.41 Urge  incontinence  N39.0 Urinary tract infection, site not specified  L30.9 Dermatitis, unspecified  I10 Essential (primary) hypertension  Z72.0 Tobacco use    M54.59 OTHER LOW BACK PAIN:  - Evaluated patient's lower back pain, considering potential causes including kidney issues and rib cage inflammation.  - Mr. Allison reports intermittent pain for 2-3 weeks, localized to the lower left side, exacerbated by backward bending.  - Ordered an x-ray of the back to investigate the cause.  - Recommend acetaminophen for pain relief until lab results are available.    R51.9 HEADACHE, UNSPECIFIED:  - Evaluated patient's reported dizzy spells, particularly when rising quickly.  - Planned to check blood pressure and perform an echocardiogram to assess the cause of dizziness.  - No specific treatment initiated for headache or dizziness pending further evaluation.    R06.00 DYSPNEA, UNSPECIFIED:  - Assessed shortness of breath in relation to smoking history and potential lung function impairment.  - Mr. Allison reports dyspnea after walking approximately one block.  - Ordered pulmonary function tests to evaluate lung capacity.    F17.210 NICOTINE DEPENDENCE, CIGARETTES, UNCOMPLICATED:  - Mr. Allison reports smoking approximately 1 pack of cigarettes per week for over 20 years.  - Educated patient on health risks associated with long-term smoking, including impacts on heart, lungs, and overall lifespan.  - Discussed the importance of smoking cessation and available resources to assist with quitting.  - Advised patient to reduce smoking with the goal of quitting.  - Offered to discuss resources or medication for smoking cessation at future appointments.    N39.41 URGE INCONTINENCE:  - Mr. Allison reports urinary frequency every 15-30 minutes for about 3 months, with decreased urine stream and occasional dysuria.  - Investigating frequent urination for possible diabetes, kidney dysfunction, or urinary tract infection.  - Ordered labs and  urinalysis to evaluate for diabetes, kidney function, and urinary tract infection.    N39.0 URINARY TRACT INFECTION, SITE NOT SPECIFIED:  - Prescribed antibiotics for 10 days to treat potential urinary tract infection.  - Ordered urinalysis to confirm infection.    L30.9 DERMATITIS, UNSPECIFIED:  - Evaluated recurrent rash in gluteal region for possible fungal infection or other dermatological conditions.  - Mr. Allison reports a recurring rash in the intergluteal cleft, lasting 2-3 days per week, with monthly recurrence, causing burning and pruritus.  - Prescribed topical cream for gluteal rash, to be applied for 10 days only.    I10 ESSENTIAL (PRIMARY) HYPERTENSION:  - Evaluated dizziness in context of current antihypertensive regimen and potential orthostatic hypotension.  - Mr. Allison's blood pressure is reported to be within normal limits during recent check-ups and at today's visit.  - Continued and refilled prescriptions for Lisinopril 10 mg and Metopr  olol succinate 25 mg for blood pressure management.  - Ordered echocardiogram to evaluate heart function.         1. MELGAR (dyspnea on exertion)  - Echo; Future  - Complete PFT w/ bronchodilator; Future    2. Pain of mid back  - X-Ray Thoracolumbar Spine AP Lateral; Future    3. Frequency of micturition  - sulfamethoxazole-trimethoprim 800-160mg (BACTRIM DS) 800-160 mg Tab; Take 1 tablet by mouth 2 (two) times daily. for 10 days  Dispense: 20 tablet; Refill: 0  - Urinalysis  - Urine Culture High Risk    4. Acute right flank pain  - sulfamethoxazole-trimethoprim 800-160mg (BACTRIM DS) 800-160 mg Tab; Take 1 tablet by mouth 2 (two) times daily. for 10 days  Dispense: 20 tablet; Refill: 0  - Urinalysis  - Urine Culture High Risk    5. Other long term (current) drug therapy  - CBC Auto Differential; Future  - COMPREHENSIVE METABOLIC PANEL; Future  - Hemoglobin A1C; Future  - Lipid Panel; Future    6. Primary hypertension  - COMPREHENSIVE METABOLIC PANEL; Future  -  lisinopriL 10 MG tablet; Take 1 tablet (10 mg total) by mouth every morning.  Dispense: 90 tablet; Refill: 3  - metoprolol succinate (TOPROL-XL) 25 MG 24 hr tablet; Take 1 tablet (25 mg total) by mouth every morning.  Dispense: 90 tablet; Refill: 3    7. Perianal dermatitis  - hydrocortisone 1 % cream; Apply topically 2 (two) times daily.  Dispense: 56 g; Refill: 2    8. Cigarette nicotine dependence without complication  Assistance with smoking cessation was offered, including:  []  Medications  [x]  Counseling  []  Printed Information on Smoking Cessation  []  Referral to a Smoking Cessation Program    Patient was counseled regarding smoking for 3-10 minutes.      Immunization History   Administered Date(s) Administered    Hepatitis A / Hepatitis B 02/10/2010    Hepatitis A, Adult 10/31/2011    Hepatitis B, Adult 08/21/2015    Hepatitis B, Pediatric/Adolescent 01/15/2015, 02/16/2015    Tdap 02/23/2017       Orders Placed This Encounter   Procedures    Urine Culture High Risk    X-Ray Thoracolumbar Spine AP Lateral    CBC Auto Differential    COMPREHENSIVE METABOLIC PANEL    Hemoglobin A1C    Lipid Panel    Urinalysis    Echo    Complete PFT w/ bronchodilator       Portions of this note were generated by [employer].    Each patient to whom medical services by telemedicine are provided:  (1) informed of the relationship between the physician and patient and the respective role of any other health care provider with respect to management of the patient; and (2) notified that he or she may decline to receive medical services by telemedicine and may withdraw from such care at any time.    I spent a total of 35 minutes face to face and non-face to face on the date of this visit.This includes time preparing to see the patient (eg, review of tests, notes), obtaining and/or reviewing additional history from an independent historian and/or outside medical records, documenting clinical information in the electronic health  record, independently interpreting results and/or communicating results to the patient/family/caregiver, or care coordinator.  Visit today included increased complexity associated with the care of the episodic problem addressed and managing the longitudinal care of the patient due to the serious and/or complex managed problem(s).      This note was generated with the assistance of ambient listening technology. Verbal consent was obtained by the patient and accompanying visitor(s) for the recording of patient appointment to facilitate this note. I attest to having reviewed and edited the generated note for accuracy, though some syntax or spelling errors may persist. Please contact the author of this note for any clarification.      Sherrill Stevens MD

## 2025-01-25 LAB
BACTERIA #/AREA URNS AUTO: NORMAL /HPF
BILIRUB UR QL STRIP: NEGATIVE
CLARITY UR REFRACT.AUTO: CLEAR
COLOR UR AUTO: YELLOW
GLUCOSE UR QL STRIP: NEGATIVE
HGB UR QL STRIP: NEGATIVE
HYALINE CASTS UR QL AUTO: 0 /LPF
KETONES UR QL STRIP: NEGATIVE
LEUKOCYTE ESTERASE UR QL STRIP: NEGATIVE
MICROSCOPIC COMMENT: NORMAL
NITRITE UR QL STRIP: NEGATIVE
PH UR STRIP: 7 [PH] (ref 5–8)
PROT UR QL STRIP: ABNORMAL
RBC #/AREA URNS AUTO: 0 /HPF (ref 0–4)
SP GR UR STRIP: 1.02 (ref 1–1.03)
SQUAMOUS #/AREA URNS AUTO: 0 /HPF
UNSPECIFIED CRY UR QL COMP ASSIST: <1
URN SPEC COLLECT METH UR: ABNORMAL
WBC #/AREA URNS AUTO: 0 /HPF (ref 0–5)

## 2025-01-26 LAB — BACTERIA UR CULT: NO GROWTH

## 2025-01-27 ENCOUNTER — HOSPITAL ENCOUNTER (EMERGENCY)
Facility: HOSPITAL | Age: 60
Discharge: HOME | End: 2025-01-27
Attending: EMERGENCY MEDICINE
Payer: COMMERCIAL

## 2025-01-27 VITALS
OXYGEN SATURATION: 98 % | TEMPERATURE: 98 F | WEIGHT: 184.81 LBS | SYSTOLIC BLOOD PRESSURE: 146 MMHG | HEART RATE: 71 BPM | RESPIRATION RATE: 18 BRPM | BODY MASS INDEX: 25.87 KG/M2 | DIASTOLIC BLOOD PRESSURE: 93 MMHG | HEIGHT: 71 IN

## 2025-01-27 DIAGNOSIS — M54.6 ACUTE RIGHT-SIDED THORACIC BACK PAIN: ICD-10-CM

## 2025-01-27 DIAGNOSIS — R10.9 FLANK PAIN: Primary | ICD-10-CM

## 2025-01-27 LAB
BILIRUB UR QL STRIP: NEGATIVE
CLARITY UR: CLEAR
COLOR UR: YELLOW
GLUCOSE UR QL STRIP: NEGATIVE
HGB UR QL STRIP: NEGATIVE
KETONES UR QL STRIP: NEGATIVE
LEUKOCYTE ESTERASE UR QL STRIP: NEGATIVE
NITRITE UR QL STRIP: NEGATIVE
PH UR STRIP: 7 [PH] (ref 5–8)
PROT UR QL STRIP: ABNORMAL
SP GR UR STRIP: 1.01 (ref 1–1.03)
URN SPEC COLLECT METH UR: ABNORMAL
UROBILINOGEN UR STRIP-ACNC: NEGATIVE EU/DL

## 2025-01-27 PROCEDURE — 99284 EMERGENCY DEPT VISIT MOD MDM: CPT | Mod: 25

## 2025-01-27 PROCEDURE — 63600175 PHARM REV CODE 636 W HCPCS: Mod: JZ,TB | Performed by: REGISTERED NURSE

## 2025-01-27 PROCEDURE — 81003 URINALYSIS AUTO W/O SCOPE: CPT | Performed by: REGISTERED NURSE

## 2025-01-27 PROCEDURE — 96372 THER/PROPH/DIAG INJ SC/IM: CPT | Performed by: REGISTERED NURSE

## 2025-01-27 RX ORDER — METHOCARBAMOL 750 MG/1
750 TABLET, FILM COATED ORAL 3 TIMES DAILY
Qty: 30 TABLET | Refills: 0 | Status: SHIPPED | OUTPATIENT
Start: 2025-01-27 | End: 2025-02-06

## 2025-01-27 RX ORDER — HYDROCODONE BITARTRATE AND ACETAMINOPHEN 7.5; 325 MG/1; MG/1
1 TABLET ORAL EVERY 6 HOURS PRN
Qty: 12 TABLET | Refills: 0 | Status: SHIPPED | OUTPATIENT
Start: 2025-01-27

## 2025-01-27 RX ORDER — KETOROLAC TROMETHAMINE 10 MG/1
10 TABLET, FILM COATED ORAL EVERY 6 HOURS PRN
Qty: 20 TABLET | Refills: 0 | Status: SHIPPED | OUTPATIENT
Start: 2025-01-27 | End: 2025-02-01

## 2025-01-27 RX ORDER — KETOROLAC TROMETHAMINE 30 MG/ML
30 INJECTION, SOLUTION INTRAMUSCULAR; INTRAVENOUS
Status: COMPLETED | OUTPATIENT
Start: 2025-01-27 | End: 2025-01-27

## 2025-01-27 RX ADMIN — KETOROLAC TROMETHAMINE 30 MG: 30 INJECTION, SOLUTION INTRAMUSCULAR at 02:01

## 2025-01-27 NOTE — ED PROVIDER NOTES
Encounter Date: 1/27/2025       History     Chief Complaint   Patient presents with    Flank Pain     Right flank pain x 2-3 weeks. Denies trauma, reports nothing makes it better. + frequent urination, denies pain with urination, denies fever, denies hematuria.     59-year-old male presents emergency department complaints of right flank pain.  Patient states pain began 2-3 weeks ago.  Patient was seen by his primary care physician and had x-rays done.  Labs are unremarkable done on 01/24/2025.  Patient reports no position of comfort.  Patient states that he feels like the pain is on the inside.  Associated symptoms include urinary frequency.  He denies any fever, chills, abdominal pain, chest pain, shortness of breath or any other symptoms.    The history is provided by the patient.     Review of patient's allergies indicates:  No Known Allergies  Past Medical History:   Diagnosis Date    History of colon polyps     History of hepatitis C     treated 2016 with complete remission    Hypertension     Major depressive disorder, single episode, unspecified      Past Surgical History:   Procedure Laterality Date    ANKLE SURGERY      COLONOSCOPY N/A 11/01/2017    Procedure: COLONOSCOPY;  Surgeon: Jose Alcocer MD;  Location: [facility] ENDO;  Service: Endoscopy;  Laterality: N/A;    COLONOSCOPY N/A 12/28/2021    Procedure: COLONOSCOPY;  Surgeon: Evette Sanchez MD;  Location: [facility] ENDO;  Service: Endoscopy;  Laterality: N/A;    FRACTURE SURGERY  02/2017    ankle    injury      left eye     Family History   Problem Relation Name Age of Onset    Thyroid disease Mother Aubree Allison     Hypertension Mother Aubree Allison     Alcohol abuse Father Kobi Allison     Hypertension Father Kobi Allison     Diabetes Maternal Aunt      Hypertension Maternal Aunt      Thyroid disease Maternal Aunt      Hypertension Maternal Uncle      Cataracts Maternal Uncle      Alcohol abuse Brother Kobi Allison Jr      Social History     Tobacco Use     Smoking status: Light Smoker     Current packs/day: 0.00     Average packs/day: 1 pack/day for 26.4 years (26.4 ttl pk-yrs)     Types: Cigarettes     Start date: 1993     Last attempt to quit: 2019     Years since quittin.6    Smokeless tobacco: Never   Substance Use Topics    Alcohol use: Yes     Alcohol/week: 1.0 standard drink of alcohol     Types: 1 Cans of beer per week     Comment: once a week    Drug use: No     Review of Systems   Constitutional:  Negative for fever.   HENT:  Negative for sore throat.    Respiratory:  Negative for shortness of breath.    Cardiovascular:  Negative for chest pain.   Gastrointestinal:  Negative for nausea.   Genitourinary:  Positive for flank pain and frequency. Negative for dysuria.   Musculoskeletal:  Positive for back pain.   Skin:  Negative for rash.   Neurological:  Negative for weakness.   Hematological:  Does not bruise/bleed easily.   All other systems reviewed and are negative.      Physical Exam     Initial Vitals [25 1338]   BP Pulse Resp Temp SpO2   135/72 70 16 98.3 °F (36.8 °C) 97 %      MAP       --         Physical Exam    Constitutional: He appears well-developed and well-nourished. No distress.   HENT:   Head: Normocephalic and atraumatic.   Nose: Nose normal. Mouth/Throat: Uvula is midline and oropharynx is clear and moist.   Eyes: Conjunctivae and EOM are normal. Pupils are equal, round, and reactive to light.   Neck: Neck supple.   Normal range of motion.  Cardiovascular:  Normal rate and regular rhythm.           Pulmonary/Chest: Effort normal and breath sounds normal. No respiratory distress. He has no decreased breath sounds. He has no wheezes. He has no rales.   Abdominal: Abdomen is soft. Bowel sounds are normal. There is no abdominal tenderness.   Musculoskeletal:         General: Normal range of motion.      Cervical back: Normal range of motion and neck supple.      Thoracic back: Tenderness present.        Back:       Comments:  There is thoracic paraspinal tenderness and right flank tenderness, no midline tenderness, patient ambulates without difficulty     Neurological: He is alert and oriented to person, place, and time. He has normal strength. GCS eye subscore is 4. GCS verbal subscore is 5. GCS motor subscore is 6.   Skin: Skin is warm and dry. Capillary refill takes less than 2 seconds. No rash noted.   Psychiatric: He has a normal mood and affect. His speech is normal and behavior is normal.         ED Course   Procedures  Labs Reviewed   URINALYSIS, REFLEX TO URINE CULTURE - Abnormal       Result Value    Specimen UA Urine, Clean Catch      Color, UA Yellow      Appearance, UA Clear      pH, UA 7.0      Specific Gravity, UA 1.010      Protein, UA Trace (*)     Glucose, UA Negative      Ketones, UA Negative      Bilirubin (UA) Negative      Occult Blood UA Negative      Nitrite, UA Negative      Urobilinogen, UA Negative      Leukocytes, UA Negative      Narrative:     Specimen Source->Urine   HEPATITIS C ANTIBODY   HEP C VIRUS HOLD SPECIMEN   HIV 1 / 2 ANTIBODY          Imaging Results              CT Renal Stone Study ABD Pelvis WO (Final result)  Result time 01/27/25 14:29:11      Final result by [facility]ne [facility] MD (01/27/25 14:29:11)                   Impression:      Unremarkable CT scan of the abdomen and pelvis.    All CT scans at this facility use dose modulation, iterative reconstructions, and/or weight base dosing when appropriate to reduce radiation dose to as low as reasonably achievable      Electronically signed by: Cheyanne Márquez MD  Date:    01/27/2025  Time:    14:29               Narrative:    EXAMINATION:  CT RENAL STONE STUDY ABD PELVIS WO    CLINICAL HISTORY:  Flank pain, kidney stone suspected;    TECHNIQUE:  Noncontrast images were obtained    COMPARISON:  None    FINDINGS:  Lung bases are clear    The liver, the spleen, and the pancreas appear normal.    The gallbladder is unremarkable.  There is  no bile duct dilatation.    The kidneys are normal.    The aorta and inferior vena cava are unremarkable.    There are no acute bowel abnormalities.     No evidence of appendicitis.  No evidence of diverticulitis.    Bladder is normal. No abnormal masses or fluid collections in the pelvis.    Skeletal structures are intact.  No acute skeletal findings.                                    Reading Physician Reading Date Result Priority   Cheyanne Márquez (Carlos A), MD  [phone] 1/25/2025      Narrative & Impression  EXAM: XR THORACOLUMBAR SPINE AP LATERAL     CLINICAL HISTORY: Back pain     FINDINGS:  AP and lateral views of the thoracic and lumbar spine.     There is endplate spurring at multiple lumbar levels.  Mild to moderate disc space narrowing at L4-5.  Thoracic spondylotic changes are seen of the mid to lower thoracic spine.  No fractures or dislocations.        Impression:   No acute bony changes.  Thoracic and lumbar spondylotic changes are present.     Finalized on: 1/25/2025 2:08 PM By:  Burke Márquez MD  [facility]# [id number]      2025-01-25 14:11:01.341    [facility]     Medications   ketorolac injection 30 mg (30 mg Intramuscular Given 1/27/25 1442)     Medical Decision Making  Amount and/or Complexity of Data Reviewed  Radiology: ordered.     Details: No acute findings    Risk  Prescription drug management.  Risk Details: I discussed with patient and/or family/caretaker that evaluation in the ED does not suggest any emergent or life threatening medical conditions requiring immediate intervention beyond what was provided in the ED, and I believe patient is safe for discharge.  Regardless, an unremarkable evaluation in the ED does not preclude the development or presence of a serious of life threatening condition. As such, patient was instructed to return immediately for any worsening or change in current symptoms.                                        Clinical Impression:  Final diagnoses:  [R10.9] Flank pain  (Primary)  [M54.6] Acute right-sided thoracic back pain          ED Disposition Condition    Discharge Stable          ED Prescriptions       Medication Sig Dispense Start Date End Date Auth. Provider    ketorolac (TORADOL) 10 mg tablet Take 1 tablet (10 mg total) by mouth every 6 (six) hours as needed for Pain. 20 tablet 1/27/2025 2/1/2025 Yassine Gillis Jr., FNP    methocarbamoL (ROBAXIN) 750 MG Tab Take 1 tablet (750 mg total) by mouth 3 (three) times daily. for 10 days 30 tablet 1/27/2025 2/6/2025 Yassine Gillis Jr., FNP    HYDROcodone-acetaminophen (NORCO) 7.5-325 mg per tablet Take 1 tablet by mouth every 6 (six) hours as needed for Pain. 12 tablet 1/27/2025 -- Yassine Gillis Jr., FNP          Follow-up Information       Follow up With Specialties Details Why Contact Info    López Escalona MD Internal Medicine In 1 week  [address]   [address]  [facility] [address]  [phone]               Yassine Gillis Jr., FNP  01/27/25 0146

## 2025-01-27 NOTE — Clinical Note
"Zane Allison (Michael) was seen and treated in our emergency department on 1/27/2025.  He may return to work on 01/28/2025.       If you have any questions or concerns, please don't hesitate to call.      Yassine Gillis Jr., FNP"
electronic

## 2025-02-08 ENCOUNTER — PATIENT MESSAGE (OUTPATIENT)
Dept: CARDIOLOGY | Facility: HOSPITAL | Age: 60
End: 2025-02-08
Payer: COMMERCIAL

## 2025-02-17 ENCOUNTER — OFFICE VISIT (OUTPATIENT)
Dept: INTERNAL MEDICINE | Facility: CLINIC | Age: 60
End: 2025-02-17
Payer: COMMERCIAL

## 2025-02-17 VITALS
BODY MASS INDEX: 26.08 KG/M2 | TEMPERATURE: 96 F | HEIGHT: 71 IN | OXYGEN SATURATION: 97 % | HEART RATE: 61 BPM | WEIGHT: 186.31 LBS | DIASTOLIC BLOOD PRESSURE: 84 MMHG | SYSTOLIC BLOOD PRESSURE: 162 MMHG

## 2025-02-17 DIAGNOSIS — R10.11 RIGHT UPPER QUADRANT PAIN: Primary | ICD-10-CM

## 2025-02-17 DIAGNOSIS — I10 PRIMARY HYPERTENSION: ICD-10-CM

## 2025-02-17 DIAGNOSIS — R14.3 FLATULENCE: ICD-10-CM

## 2025-02-17 PROCEDURE — 3044F HG A1C LEVEL LT 7.0%: CPT | Mod: CPTII,S$GLB,,

## 2025-02-17 PROCEDURE — 3079F DIAST BP 80-89 MM HG: CPT | Mod: CPTII,S$GLB,,

## 2025-02-17 PROCEDURE — 3077F SYST BP >= 140 MM HG: CPT | Mod: CPTII,S$GLB,,

## 2025-02-17 RX ORDER — PANTOPRAZOLE SODIUM 40 MG/1
40 TABLET, DELAYED RELEASE ORAL DAILY
Qty: 90 TABLET | Refills: 3 | Status: SHIPPED | OUTPATIENT
Start: 2025-02-17 | End: 2026-02-17

## 2025-02-17 RX ORDER — SIMETHICONE 80 MG
80 TABLET,CHEWABLE ORAL EVERY 6 HOURS PRN
Qty: 60 TABLET | Refills: 0 | Status: SHIPPED | OUTPATIENT
Start: 2025-02-17

## 2025-02-17 NOTE — PROGRESS NOTES
Patient ID: Zane Allison is a 59 y.o. male.    Chief Complaint: No chief complaint on file.    History of Present Illness    CHIEF COMPLAINT:  - Mr. Allison presents with persistent abdominal and back pain, accompanied by frequent urination and a sensation of gas.    HPI:  Mr. Allison reports abdominal and back pain for approximately 1.5 months. The pain moves from the back to the side, and sometimes under the rib. He describes the pain as severe, indicating significant discomfort. It affects his mobility, making it difficult to turn, sit up, or bend over. The pain is exacerbated by certain movements, such as turning to the right or left, bending over, and deep breathing.    He reports a constant feeling of gas, which seems related to the pain. Taking Gas-X along with pain medication provides temporary relief. The pain moves around the abdominal area, from the ribs to the back, and sometimes up towards the lungs. Sitting in hot water exacerbates the pain, causing it to move to the back.    He reports frequent urination, stating that after drinking water or any liquid, he might urinate 2-3 times an hour. He also mentions getting tired quickly and feeling out of breath at times.    He visited the emergency department a few days after his last appointment on the 24th, where a CT was performed. Pain medication was prescribed, but he reports that while it helps initially, the pain recurs shortly after.    He denies nausea, vomiting, diarrhea, loose stools, rash, redness, burning sensation, itching, or pain while urinating.      ROS:  General: -fever, -chills, +fatigue, -weight gain, -weight loss  Eyes: -vision changes, -redness, -discharge  ENT: -ear pain, -nasal congestion, -sore throat  Cardiovascular: -chest pain, -palpitations, -lower extremity edema  Respiratory: -cough, -shortness of breath  Gastrointestinal: +abdominal pain, -nausea, -vomiting, -diarrhea, -constipation, -blood in stool  Genitourinary: -dysuria,  -hematuria, -frequency  Musculoskeletal: -joint pain, -muscle pain, +back pain  Skin: -rash, -lesion, -itching  Neurological: -headache, -dizziness, -numbness, -tingling  Psychiatric: -anxiety, -depression, -sleep difficulty         Pmh, Psh, Family Hx, Social Hx updated in Epic Tabs today.         10/9/2024    11:27 AM 1/11/2023     7:54 AM 1/3/2022     7:27 AM 11/5/2021     6:59 AM   Depression Patient Health Questionnaire   Over the last two weeks how often have you been bothered by little interest or pleasure in doing things More than half the days Not at all  Not at all  Not at all    Over the last two weeks how often have you been bothered by feeling down, depressed or hopeless Several days Not at all Not at all  Not at all    PHQ-2 Total Score 3 0 0 0   Over the last two weeks how often have you been bothered by trouble falling or staying asleep, or sleeping too much Nearly every day      Over the last two weeks how often have you been bothered by feeling tired or having little energy Nearly every day      Over the last two weeks how often have you been bothered by a poor appetite or overeating Nearly every day      Over the last two weeks how often have you been bothered by feeling bad about yourself - or that you are a failure or have let yourself or your family down Not at all      Over the last two weeks how often have you been bothered by trouble concentrating on things, such as reading the newspaper or watching television Not at all      Over the last two weeks how often have you been bothered by moving or speaking so slowly that other people could have noticed. Or the opposite - being so fidgety or restless that you have been moving around a lot more than usual. Not at all      Over the last two weeks how often have you been bothered by thoughts that you would be better off dead, or of hurting yourself Not at all      If you checked off any problems, how difficult have these problems made it for you to  do your work, take care of things at home or get along with other people? Not difficult at all      PHQ-9 Score 12      PHQ-9 Interpretation Moderate          Data saved with a previous flowsheet row definition       Active Problem List with Overview Notes    Diagnosis Date Noted    Major depressive disorder, single episode, unspecified     History of colon polyps 2022    Hypertension 2022       Past Medical History:   Diagnosis Date    History of colon polyps     History of hepatitis C     treated  with complete remission    Hypertension     Major depressive disorder, single episode, unspecified        Past Surgical History:   Procedure Laterality Date    ANKLE SURGERY      COLONOSCOPY N/A 2017    Procedure: COLONOSCOPY;  Surgeon: Jose Alcocer MD;  Location: [facility] ENDO;  Service: Endoscopy;  Laterality: N/A;    COLONOSCOPY N/A 2021    Procedure: COLONOSCOPY;  Surgeon: Evette Sanchez MD;  Location: [facility] ENDO;  Service: Endoscopy;  Laterality: N/A;    FRACTURE SURGERY  2017    ankle    injury      left eye       Family History   Problem Relation Name Age of Onset    Thyroid disease Mother Aubree Allison     Hypertension Mother Aubree Allison     Alcohol abuse Father Kobi Allison     Hypertension Father Kobi Allison     Diabetes Maternal Aunt      Hypertension Maternal Aunt      Thyroid disease Maternal Aunt      Hypertension Maternal Uncle      Cataracts Maternal Uncle      Alcohol abuse Brother Kobi Allison         Social History     Socioeconomic History    Marital status:    Tobacco Use    Smoking status: Light Smoker     Current packs/day: 0.00     Average packs/day: 1 pack/day for 26.4 years (26.4 ttl pk-yrs)     Types: Cigarettes     Start date: 1993     Last attempt to quit: 2019     Years since quittin.7    Smokeless tobacco: Never   Substance and Sexual Activity    Alcohol use: Yes     Alcohol/week: 1.0 standard drink of alcohol     Types: 1 Cans of beer per  week     Comment: once a week    Drug use: No    Sexual activity: Yes     Partners: Female     Birth control/protection: None   Social History Narrative    ** Merged History Encounter **          Social Drivers of Health     Financial Resource Strain: Low Risk  (12/3/2020)    Overall Financial Resource Strain (CARDIA)     Difficulty of Paying Living Expenses: Not hard at all   Food Insecurity: No Food Insecurity (5/15/2024)    Hunger Vital Sign     Worried About Running Out of Food in the Last Year: Never true     Ran Out of Food in the Last Year: Never true   Transportation Needs: No Transportation Needs (5/15/2024)    PRAPARE - Transportation     Lack of Transportation (Medical): No     Lack of Transportation (Non-Medical): No   Physical Activity: Inactive (5/15/2024)    Exercise Vital Sign     Days of Exercise per Week: 0 days     Minutes of Exercise per Session: 0 min   Stress: Stress Concern Present (5/15/2024)    [employer] - Occupational Stress Questionnaire     Feeling of Stress : To some extent   Housing Stability: Unknown (5/15/2024)    Housing Stability Vital Sign     Unable to Pay for Housing in the Last Year: No       Medications Ordered Prior to Encounter[1]    Review of patient's allergies indicates:  No Known Allergies    General - Well developed, alert and oriented in NAD  HEENT - normocephalic, no evidence of trauma, sclera white, EOMI  Neck - full range of motion  COR - regular rate and rhythm without murmurs or gallops  Lungs - Clear  Abdomen - soft, non-tender  Ext - no cyanosis or edema     Assessment:     1. Right upper quadrant pain    2. Primary hypertension    3. Flatulence        Pertinent Labs:    Chemistry        Component Value Date/Time     01/24/2025 1437    K 3.9 01/24/2025 1437     01/24/2025 1437    CO2 28 01/24/2025 1437    BUN 12 01/24/2025 1437    CREATININE 1.2 01/24/2025 1437    GLU 83 01/24/2025 1437        Component Value Date/Time  "   CALCIUM 9.8 01/24/2025 1437    ALKPHOS 58 01/24/2025 1437    AST 21 01/24/2025 1437    ALT 21 01/24/2025 1437    BILITOT 0.4 01/24/2025 1437    ESTGFRAFRICA >60.0 11/05/2021 0740    EGFRNONAA >60.0 11/05/2021 0740          Lab Results   Component Value Date    WBC 5.62 01/24/2025    HGB 15.6 01/24/2025    HCT 48.0 01/24/2025    MCV 95 01/24/2025    MCH 31.0 01/24/2025    MCHC 32.5 01/24/2025    RDW 12.3 01/24/2025     01/24/2025    MPV 13.0 (H) 01/24/2025    NEUTROABS 2.2 06/05/2024    LYMPHOPCT 47 06/05/2024       Lab Results   Component Value Date    HGBA1C 4.9 01/24/2025    HGBA1C 4.8 02/25/2022    GLU 83 01/24/2025     Lab Results   Component Value Date    LDLCALC 90.2 01/24/2025     Lab Results   Component Value Date    TSH 0.543 10/10/2024     Lab Results   Component Value Date    CHOL 170 01/24/2025    CHOL 185 10/10/2024    CHOL 190 01/11/2023     Lab Results   Component Value Date    TRIG 209 (H) 01/24/2025    TRIG 98 10/10/2024    TRIG 126 01/11/2023     Lab Results   Component Value Date    HDL 38 (L) 01/24/2025    HDL 42 10/10/2024    HDL 42 01/11/2023     Lab Results   Component Value Date    LDLCALC 90.2 01/24/2025    LDLCALC 123.4 10/10/2024    LDLCALC 122.8 01/11/2023     No results found for: "NONHDLC"  Lab Results   Component Value Date    CHOLHDL 22.4 01/24/2025    CHOLHDL 22.7 10/10/2024    CHOLHDL 22.1 01/11/2023       The 10-year ASCVD risk score (Idalmis AYALA, et al., 2019) is: 30%    Values used to calculate the score:      Age: 59 years      Sex: Male      Is Non- : Yes      Diabetic: No      Tobacco smoker: Yes      Systolic Blood Pressure: 162 mmHg      Is BP treated: Yes      HDL Cholesterol: 38 mg/dL      Total Cholesterol: 170 mg/dL    Plan:     Assessment & Plan    I10 Essential (primary) hypertension  R10.13 Epigastric pain  R14.0 Abdominal distension (gaseous)  R35.0 Frequency of micturition  R06.02 Shortness of breath  R07.1 Chest pain on " breathing  R53.83 Other fatigue    I10 ESSENTIAL (PRIMARY) HYPERTENSION:  - Noted slightly elevated blood pressure.  - Mr. Allison's blood pressure has been mildly elevated.  - Prescribed lisinopril 10 mg and metoprolol succinate 25 mg for blood pressure management.    R10.13 EPIGASTRIC PAIN:  - Considered gallbladder as potential source of pain due to location and characteristics, despite normal CT findings.  - Ruled out kidney stones, infection, appendicitis, and pancreatic issues based on previous CT and urinalysis.  - Suspect possible liver involvement or ribcage-related pain.  - Considered acid reflux as a potential contributor to symptoms.  - Prescribed medication for acid reflux to help with abdominal pain.  - Ordered ultrasound of liver for further evaluation.  - Mr. Allison reports persistent pain in the epigastric region, moving from back to side and under the rib, ongoing for approximately 1.5 months.  - Physical exam revealed pain in the rib area, sensitive to deep breathing.    R14.0 ABDOMINAL DISTENSION (GASEOUS):  - Prescribed medication for gas relief to help with abdominal discomfort.  - Mr. Allison reports constant gas and abdominal discomfort.    R35.0 FREQUENCY OF MICTURITION:  - Mr. Allison reports frequent urination, especially after drinking water.  - Confirmed no pain during urination, just increased frequency.  - Noted that urine test was normal, ruling out infection.    R06.02 SHORTNESS OF BREATH:  - Ordered lung function test (PFTs).  - Consider CT of chest if symptoms persist after other tests.  - Mr. Allison reports getting fatigued quickly and feeling dyspneic.  - Plan to review results of previously ordered lung function test.    R07.1 CHEST PAIN ON BREATHING:  - Suspect possible ribcage-related pain.  - Consider CT of chest if symptoms persist after other tests.  - Mr. Allison reports pain in the rib area when taking deep breaths.  - Plan to review results of lung function test and possibly order  a CT of the chest.    R53.83 OTHER FATIGUE:  - Mr. Allison reports getting fatigued quickly.         1. Right upper quadrant pain  - US Abdomen Limited_Gallbladder; Future  - X-Ray Chest PA And Lateral; Future  - pantoprazole (PROTONIX) 40 MG tablet; Take 1 tablet (40 mg total) by mouth once daily.  Dispense: 90 tablet; Refill: 3  - simethicone (GAS RELIEF, SIMETHICONE,) 80 MG chewable tablet; Take 1 tablet (80 mg total) by mouth every 6 (six) hours as needed for Flatulence.  Dispense: 60 tablet; Refill: 0    2. Primary hypertension    3. Flatulence  - pantoprazole (PROTONIX) 40 MG tablet; Take 1 tablet (40 mg total) by mouth once daily.  Dispense: 90 tablet; Refill: 3  - simethicone (GAS RELIEF, SIMETHICONE,) 80 MG chewable tablet; Take 1 tablet (80 mg total) by mouth every 6 (six) hours as needed for Flatulence.  Dispense: 60 tablet; Refill: 0      Immunization History   Administered Date(s) Administered    Hepatitis A / Hepatitis B 02/10/2010    Hepatitis A, Adult 10/31/2011    Hepatitis B, Adult 08/21/2015    Hepatitis B, Pediatric/Adolescent 01/15/2015, 02/16/2015    Tdap 02/23/2017       Orders Placed This Encounter   Procedures    US Abdomen Limited_Gallbladder    X-Ray Chest PA And Lateral       Portions of this note were generated by jigl.    Each patient to whom medical services by telemedicine are provided:  (1) informed of the relationship between the physician and patient and the respective role of any other health care provider with respect to management of the patient; and (2) notified that he or she may decline to receive medical services by telemedicine and may withdraw from such care at any time.    I spent a total of 35 minutes face to face and non-face to face on the date of this visit.This includes time preparing to see the patient (eg, review of tests, notes), obtaining and/or reviewing additional history from an independent historian and/or outside medical records, documenting clinical  information in the electronic health record, independently interpreting results and/or communicating results to the patient/family/caregiver, or care coordinator.  Visit today included increased complexity associated with the care of the episodic problem addressed and managing the longitudinal care of the patient due to the serious and/or complex managed problem(s).      This note was generated with the assistance of ambient listening technology. Verbal consent was obtained by the patient and accompanying visitor(s) for the recording of patient appointment to facilitate this note. I attest to having reviewed and edited the generated note for accuracy, though some syntax or spelling errors may persist. Please contact the author of this note for any clarification.      Sherrill Stevens MD         [1]   Current Outpatient Medications on File Prior to Visit   Medication Sig Dispense Refill    HYDROcodone-acetaminophen (NORCO) 7.5-325 mg per tablet Take 1 tablet by mouth every 6 (six) hours as needed for Pain. 12 tablet 0    hydrocortisone 1 % cream Apply topically 2 (two) times daily. 56 g 2    lisinopriL 10 MG tablet Take 1 tablet (10 mg total) by mouth every morning. 90 tablet 3    metoprolol succinate (TOPROL-XL) 25 MG 24 hr tablet Take 1 tablet (25 mg total) by mouth every morning. 90 tablet 3     No current facility-administered medications on file prior to visit.

## 2025-02-27 ENCOUNTER — HOSPITAL ENCOUNTER (OUTPATIENT)
Dept: PREADMISSION TESTING | Facility: HOSPITAL | Age: 60
Discharge: HOME | End: 2025-02-27
Attending: INTERNAL MEDICINE
Payer: COMMERCIAL

## 2025-02-27 ENCOUNTER — TELEPHONE (OUTPATIENT)
Dept: INTERNAL MEDICINE | Facility: CLINIC | Age: 60
End: 2025-02-27
Payer: COMMERCIAL

## 2025-02-27 DIAGNOSIS — Z86.0100 HISTORY OF COLON POLYPS: ICD-10-CM

## 2025-02-27 NOTE — TELEPHONE ENCOUNTER
Spoke with pt, rescheduled echo, US, and xray to tomorrow as requested. Pt verbalized understanding.

## 2025-02-27 NOTE — TELEPHONE ENCOUNTER
----- Message from Nanda sent at 2/27/2025  8:36 AM CST -----  Type:  Needs Medical AdviceWho Called: ptSymptoms (please be specific): na How long has patient had these symptoms:  naPharmacy name and phone #:  naWould the patient rather a call back or a response via [facility]? callBest Call Back Number: [phone] Information: patient requesting to speak with office as he had audio appt for colonoscopy this morning and was advised to schedule an us for the heart but needs order before he can schedule. Pt requesting a call back asa as he has two appts on Tuesday 03/04 and would like to try to schedule on the same day at the [facility]

## 2025-02-28 ENCOUNTER — HOSPITAL ENCOUNTER (OUTPATIENT)
Dept: RADIOLOGY | Facility: HOSPITAL | Age: 60
Discharge: HOME | End: 2025-02-28
Payer: COMMERCIAL

## 2025-02-28 ENCOUNTER — HOSPITAL ENCOUNTER (OUTPATIENT)
Dept: CARDIOLOGY | Facility: HOSPITAL | Age: 60
Discharge: HOME | End: 2025-02-28
Payer: COMMERCIAL

## 2025-02-28 VITALS
WEIGHT: 186 LBS | BODY MASS INDEX: 26.04 KG/M2 | SYSTOLIC BLOOD PRESSURE: 162 MMHG | DIASTOLIC BLOOD PRESSURE: 84 MMHG | HEIGHT: 71 IN

## 2025-02-28 DIAGNOSIS — R06.09 DOE (DYSPNEA ON EXERTION): ICD-10-CM

## 2025-02-28 DIAGNOSIS — R10.11 RIGHT UPPER QUADRANT PAIN: ICD-10-CM

## 2025-02-28 LAB
AORTIC ROOT ANNULUS: 2.8 CM
ASCENDING AORTA: 2.95 CM
AV INDEX (PROSTH): 0.79
AV MEAN GRADIENT: 3 MMHG
AV PEAK GRADIENT: 6 MMHG
AV VALVE AREA BY VELOCITY RATIO: 2.4 CM²
AV VALVE AREA: 2.5 CM²
AV VELOCITY RATIO: 0.75
BSA FOR ECHO PROCEDURE: 2.06 M2
CV ECHO LV RWT: 0.48 CM
DOP CALC AO PEAK VEL: 1.2 M/S
DOP CALC AO VTI: 21.9 CM
DOP CALC LVOT AREA: 3.1 CM2
DOP CALC LVOT DIAMETER: 2 CM
DOP CALC LVOT PEAK VEL: 0.9 M/S
DOP CALC LVOT STROKE VOLUME: 54.6 CM3
DOP CALC RVOT PEAK VEL: 0.89 M/S
DOP CALC RVOT VTI: 20.8 CM
DOP CALCLVOT PEAK VEL VTI: 17.4 CM
E WAVE DECELERATION TIME: 183 MSEC
E/A RATIO: 1
E/E' RATIO: 8 M/S
ECHO LV POSTERIOR WALL: 1 CM (ref 0.6–1.1)
FRACTIONAL SHORTENING: 35.7 % (ref 28–44)
INTERVENTRICULAR SEPTUM: 1.2 CM (ref 0.6–1.1)
IVC DIAMETER: 1.7 CM
IVRT: 114 MSEC
LA MAJOR: 4.9 CM
LA MINOR: 4.6 CM
LA WIDTH: 4.2 CM
LEFT ATRIUM AREA SYSTOLIC (APICAL 2 CHAMBER): 19.88 CM2
LEFT ATRIUM AREA SYSTOLIC (APICAL 4 CHAMBER): 20.45 CM2
LEFT ATRIUM SIZE: 3.7 CM
LEFT ATRIUM VOLUME INDEX MOD: 30 ML/M2
LEFT ATRIUM VOLUME INDEX: 31 ML/M2
LEFT ATRIUM VOLUME MOD: 61 ML
LEFT ATRIUM VOLUME: 63 CM3
LEFT INTERNAL DIMENSION IN SYSTOLE: 2.7 CM (ref 2.1–4)
LEFT VENTRICLE DIASTOLIC VOLUME INDEX: 38.73 ML/M2
LEFT VENTRICLE DIASTOLIC VOLUME: 79 ML
LEFT VENTRICLE END SYSTOLIC VOLUME APICAL 2 CHAMBER: 60.46 ML
LEFT VENTRICLE END SYSTOLIC VOLUME APICAL 4 CHAMBER: 61.16 ML
LEFT VENTRICLE MASS INDEX: 77 G/M2
LEFT VENTRICLE SYSTOLIC VOLUME INDEX: 12.7 ML/M2
LEFT VENTRICLE SYSTOLIC VOLUME: 26 ML
LEFT VENTRICULAR INTERNAL DIMENSION IN DIASTOLE: 4.2 CM (ref 3.5–6)
LEFT VENTRICULAR MASS: 157.1 G
LV LATERAL E/E' RATIO: 6.4 M/S
LV SEPTAL E/E' RATIO: 9.1 M/S
LVED V (TEICH): 78.61 ML
LVES V (TEICH): 26.23 ML
LVOT MG: 1.56 MMHG
LVOT MV: 0.58 CM/S
MV PEAK A VEL: 0.64 M/S
MV PEAK E VEL: 0.64 M/S
OHS CV RV/LV RATIO: 0.79 CM
PISA TR MAX VEL: 2.2 M/S
PV MEAN GRADIENT: 2 MMHG
PV PEAK GRADIENT: 4 MMHG
PV PEAK VELOCITY: 0.97 M/S
RA MAJOR: 4.62 CM
RA PRESSURE ESTIMATED: 3 MMHG
RA WIDTH: 3.24 CM
RIGHT VENTRICLE DIASTOLIC BASEL DIMENSION: 3.3 CM
RIGHT VENTRICULAR END-DIASTOLIC DIMENSION: 3.34 CM
RV TB RVSP: 5 MMHG
SINUS: 2.82 CM
STJ: 2.63 CM
TDI LATERAL: 0.1 M/S
TDI SEPTAL: 0.07 M/S
TDI: 0.09 M/S
TRICUSPID ANNULAR PLANE SYSTOLIC EXCURSION: 2.01 CM
TV REST PULMONARY ARTERY PRESSURE: 22 MMHG
Z-SCORE OF LEFT VENTRICULAR DIMENSION IN END DIASTOLE: -3.7
Z-SCORE OF LEFT VENTRICULAR DIMENSION IN END SYSTOLE: -2.55

## 2025-02-28 PROCEDURE — 71046 X-RAY EXAM CHEST 2 VIEWS: CPT | Mod: 26,,, | Performed by: RADIOLOGY

## 2025-02-28 PROCEDURE — 71046 X-RAY EXAM CHEST 2 VIEWS: CPT | Mod: TC

## 2025-02-28 PROCEDURE — 76705 ECHO EXAM OF ABDOMEN: CPT | Mod: 26,,, | Performed by: RADIOLOGY

## 2025-02-28 PROCEDURE — 76705 ECHO EXAM OF ABDOMEN: CPT | Mod: TC

## 2025-02-28 PROCEDURE — 93306 TTE W/DOPPLER COMPLETE: CPT

## 2025-02-28 PROCEDURE — 93306 TTE W/DOPPLER COMPLETE: CPT | Mod: 26,,, | Performed by: INTERNAL MEDICINE

## 2025-03-06 ENCOUNTER — PATIENT MESSAGE (OUTPATIENT)
Dept: ADMINISTRATIVE | Facility: HOSPITAL | Age: 60
End: 2025-03-06
Payer: COMMERCIAL

## 2025-03-13 ENCOUNTER — OFFICE VISIT (OUTPATIENT)
Dept: INTERNAL MEDICINE | Facility: CLINIC | Age: 60
End: 2025-03-13
Payer: COMMERCIAL

## 2025-03-13 VITALS
DIASTOLIC BLOOD PRESSURE: 85 MMHG | HEART RATE: 78 BPM | TEMPERATURE: 96 F | WEIGHT: 177.25 LBS | OXYGEN SATURATION: 97 % | BODY MASS INDEX: 24.72 KG/M2 | SYSTOLIC BLOOD PRESSURE: 164 MMHG

## 2025-03-13 DIAGNOSIS — I10 PRIMARY HYPERTENSION: Primary | ICD-10-CM

## 2025-03-13 DIAGNOSIS — R07.9 RIGHT-SIDED CHEST PAIN: ICD-10-CM

## 2025-03-13 DIAGNOSIS — R10.11 RIGHT UPPER QUADRANT PAIN: ICD-10-CM

## 2025-03-13 PROCEDURE — 99214 OFFICE O/P EST MOD 30 MIN: CPT | Mod: S$GLB,,,

## 2025-03-13 PROCEDURE — 3079F DIAST BP 80-89 MM HG: CPT | Mod: CPTII,S$GLB,,

## 2025-03-13 PROCEDURE — 99999 PR PBB SHADOW E&M-EST. PATIENT-LVL IV: CPT | Mod: PBBFAC,,,

## 2025-03-13 PROCEDURE — 3008F BODY MASS INDEX DOCD: CPT | Mod: CPTII,S$GLB,,

## 2025-03-13 PROCEDURE — G2211 COMPLEX E/M VISIT ADD ON: HCPCS | Mod: S$GLB,,,

## 2025-03-13 PROCEDURE — 1159F MED LIST DOCD IN RCRD: CPT | Mod: CPTII,S$GLB,,

## 2025-03-13 PROCEDURE — 3044F HG A1C LEVEL LT 7.0%: CPT | Mod: CPTII,S$GLB,,

## 2025-03-13 PROCEDURE — 4010F ACE/ARB THERAPY RXD/TAKEN: CPT | Mod: CPTII,S$GLB,,

## 2025-03-13 PROCEDURE — 3077F SYST BP >= 140 MM HG: CPT | Mod: CPTII,S$GLB,,

## 2025-03-13 RX ORDER — CYCLOBENZAPRINE HCL 10 MG
10 TABLET ORAL 3 TIMES DAILY PRN
Qty: 30 TABLET | Refills: 0 | Status: SHIPPED | OUTPATIENT
Start: 2025-03-13 | End: 2025-03-23

## 2025-03-13 RX ORDER — LISINOPRIL 20 MG/1
20 TABLET ORAL EVERY MORNING
Qty: 30 TABLET | Refills: 2 | Status: SHIPPED | OUTPATIENT
Start: 2025-03-13 | End: 2025-03-13

## 2025-03-13 RX ORDER — LISINOPRIL AND HYDROCHLOROTHIAZIDE 20; 25 MG/1; MG/1
1 TABLET ORAL DAILY
Qty: 90 TABLET | Refills: 3 | Status: SHIPPED | OUTPATIENT
Start: 2025-03-13 | End: 2026-03-13

## 2025-03-13 NOTE — PROGRESS NOTES
Patient ID: Zane Allison is a 59 y.o. male.    Chief Complaint: Rib Injury (Pain x 3 months )    History of Present Illness    CHIEF COMPLAINT:  - Mr. Allison presents with worsening chest pain and difficulty breathing.    HPI:  Mr. Allison reports worsening chest pain, described as a catching sensation in the chest area. The pain is located in the ribcage area, extends around to the back, and is exacerbated by certain movements and when catching his breath. Mr. Allison has previously undergone an ultrasound for this issue, but the symptoms have persisted and are worsening. He also mentions painful gas and a cough for about a month. Mr. Allison had previously come to the hospital for clearance for a procedure, possibly a colonoscopy, about a month ago. He has a history of high blood pressure, which is consistently elevated during visits. Mr. Allison has been taking lisinopril 10 mg for blood pressure management without any reported problems. He denies that gas medication helps with the symptoms.      ROS:  General: -fever, -chills, -fatigue, -weight gain, -weight loss  Eyes: -vision changes, -redness, -discharge  ENT: -ear pain, -nasal congestion, -sore throat  Cardiovascular: +chest pain, -palpitations, -lower extremity edema  Respiratory: +cough, +shortness of breath, +difficulty breathing  Gastrointestinal: -abdominal pain, -nausea, -vomiting, -diarrhea, -constipation, -blood in stool  Genitourinary: -dysuria, -hematuria, -frequency  Musculoskeletal: -joint pain, -muscle pain, +back pain, +pain with movement  Skin: -rash, -lesion  Neurological: -headache, -dizziness, -numbness, -tingling  Psychiatric: -anxiety, -depression, -sleep difficulty         Pmh, Psh, Family Hx, Social Hx updated in Epic Tabs today.         10/9/2024    11:27 AM 1/11/2023     7:54 AM 1/3/2022     7:27 AM 11/5/2021     6:59 AM   Depression Patient Health Questionnaire   Over the last two weeks how often have you been bothered by little  interest or pleasure in doing things More than half the days Not at all  Not at all  Not at all    Over the last two weeks how often have you been bothered by feeling down, depressed or hopeless Several days Not at all Not at all  Not at all    PHQ-2 Total Score 3 0 0 0   Over the last two weeks how often have you been bothered by trouble falling or staying asleep, or sleeping too much Nearly every day      Over the last two weeks how often have you been bothered by feeling tired or having little energy Nearly every day      Over the last two weeks how often have you been bothered by a poor appetite or overeating Nearly every day      Over the last two weeks how often have you been bothered by feeling bad about yourself - or that you are a failure or have let yourself or your family down Not at all      Over the last two weeks how often have you been bothered by trouble concentrating on things, such as reading the newspaper or watching television Not at all      Over the last two weeks how often have you been bothered by moving or speaking so slowly that other people could have noticed. Or the opposite - being so fidgety or restless that you have been moving around a lot more than usual. Not at all      Over the last two weeks how often have you been bothered by thoughts that you would be better off dead, or of hurting yourself Not at all      If you checked off any problems, how difficult have these problems made it for you to do your work, take care of things at home or get along with other people? Not difficult at all      PHQ-9 Score 12      PHQ-9 Interpretation Moderate          Data saved with a previous flowsheet row definition       Active Problem List with Overview Notes    Diagnosis Date Noted    Major depressive disorder, single episode, unspecified     History of colon polyps 02/22/2022    Hypertension 02/22/2022       Past Medical History:   Diagnosis Date    History of colon polyps     History of  hepatitis C     treated  with complete remission    Hypertension     Major depressive disorder, single episode, unspecified        Past Surgical History:   Procedure Laterality Date    ANKLE SURGERY      COLONOSCOPY N/A 2017    Procedure: COLONOSCOPY;  Surgeon: Jose Alcocer MD;  Location: [facility] ENDO;  Service: Endoscopy;  Laterality: N/A;    COLONOSCOPY N/A 2021    Procedure: COLONOSCOPY;  Surgeon: Evette Sanchez MD;  Location: [facility] ENDO;  Service: Endoscopy;  Laterality: N/A;    FRACTURE SURGERY  2017    ankle    injury      left eye       Family History   Problem Relation Name Age of Onset    Thyroid disease Mother Aubree Allison     Hypertension Mother Aubree Allison     Alcohol abuse Father Kobi Allison     Hypertension Father oKbi Allison     Diabetes Maternal Aunt      Hypertension Maternal Aunt      Thyroid disease Maternal Aunt      Hypertension Maternal Uncle      Cataracts Maternal Uncle      Alcohol abuse Brother Kobi Allison         Social History     Socioeconomic History    Marital status:    Tobacco Use    Smoking status: Light Smoker     Current packs/day: 0.00     Average packs/day: 1 pack/day for 26.4 years (26.4 ttl pk-yrs)     Types: Cigarettes     Start date: 1993     Last attempt to quit: 2019     Years since quittin.7    Smokeless tobacco: Never   Substance and Sexual Activity    Alcohol use: Yes     Alcohol/week: 1.0 standard drink of alcohol     Types: 1 Cans of beer per week     Comment: once a week    Drug use: No    Sexual activity: Yes     Partners: Female     Birth control/protection: None   Social History Narrative    ** Merged History Encounter **          Social Drivers of Health     Financial Resource Strain: Low Risk  (12/3/2020)    Overall Financial Resource Strain (CARDIA)     Difficulty of Paying Living Expenses: Not hard at all   Food Insecurity: No Food Insecurity (5/15/2024)    Hunger Vital Sign     Worried About Running Out of Food in  the Last Year: Never true     Ran Out of Food in the Last Year: Never true   Transportation Needs: No Transportation Needs (5/15/2024)    PRAPARE - Transportation     Lack of Transportation (Medical): No     Lack of Transportation (Non-Medical): No   Physical Activity: Inactive (5/15/2024)    Exercise Vital Sign     Days of Exercise per Week: 0 days     Minutes of Exercise per Session: 0 min   Stress: Stress Concern Present (5/15/2024)    [employer] - Occupational Stress Questionnaire     Feeling of Stress : To some extent   Housing Stability: Unknown (5/15/2024)    Housing Stability Vital Sign     Unable to Pay for Housing in the Last Year: No       Medications Ordered Prior to Encounter[1]    Review of patient's allergies indicates:  No Known Allergies    General - Well developed, alert and oriented in NAD  HEENT - normocephalic, no evidence of trauma, sclera white, EOMI  Neck - full range of motion  COR - regular rate and rhythm without murmurs or gallops  Lungs - Clear  Abdomen - soft, non-tender  Ext - no cyanosis or edema     Assessment:     1. Right upper quadrant pain    2. Right-sided chest pain    3. Primary hypertension        Pertinent Labs:    Chemistry        Component Value Date/Time     01/24/2025 1437    K 3.9 01/24/2025 1437     01/24/2025 1437    CO2 28 01/24/2025 1437    BUN 12 01/24/2025 1437    CREATININE 1.2 01/24/2025 1437    GLU 83 01/24/2025 1437        Component Value Date/Time    CALCIUM 9.8 01/24/2025 1437    ALKPHOS 58 01/24/2025 1437    AST 21 01/24/2025 1437    ALT 21 01/24/2025 1437    BILITOT 0.4 01/24/2025 1437    ESTGFRAFRICA >60.0 11/05/2021 0740    EGFRNONAA >60.0 11/05/2021 0740          Lab Results   Component Value Date    WBC 5.62 01/24/2025    HGB 15.6 01/24/2025    HCT 48.0 01/24/2025    MCV 95 01/24/2025    MCH 31.0 01/24/2025    MCHC 32.5 01/24/2025    RDW 12.3 01/24/2025     01/24/2025    MPV 13.0 (H) 01/24/2025     "NEUTROABS 2.2 06/05/2024    LYMPHOPCT 47 06/05/2024       Lab Results   Component Value Date    HGBA1C 4.9 01/24/2025    HGBA1C 4.8 02/25/2022    GLU 83 01/24/2025     Lab Results   Component Value Date    LDLCALC 90.2 01/24/2025     Lab Results   Component Value Date    TSH 0.543 10/10/2024     Lab Results   Component Value Date    CHOL 170 01/24/2025    CHOL 185 10/10/2024    CHOL 190 01/11/2023     Lab Results   Component Value Date    TRIG 209 (H) 01/24/2025    TRIG 98 10/10/2024    TRIG 126 01/11/2023     Lab Results   Component Value Date    HDL 38 (L) 01/24/2025    HDL 42 10/10/2024    HDL 42 01/11/2023     Lab Results   Component Value Date    LDLCALC 90.2 01/24/2025    LDLCALC 123.4 10/10/2024    LDLCALC 122.8 01/11/2023     No results found for: "NONHDLC"  Lab Results   Component Value Date    CHOLHDL 22.4 01/24/2025    CHOLHDL 22.7 10/10/2024    CHOLHDL 22.1 01/11/2023       The 10-year ASCVD risk score (Idalmis DK, et al., 2019) is: 30.6%    Values used to calculate the score:      Age: 59 years      Sex: Male      Is Non- : Yes      Diabetic: No      Tobacco smoker: Yes      Systolic Blood Pressure: 164 mmHg      Is BP treated: Yes      HDL Cholesterol: 38 mg/dL      Total Cholesterol: 170 mg/dL    Plan:     Assessment & Plan    Assessed worsening symptoms, including breathing difficulties and pain in ribcage area  Considered possibility of muscular issue in ribcage based on patient's description  Evaluated previous ultrasound results and chest XR, which were unremarkable  Determined need for further imaging due to persistent and worsening symptoms  Planned to increase blood pressure medication due to consistently elevated readings  Reviewed recent left heart catheterization results, which were normal  Noted slightly elevated triglycerides in recent bloodwork    MAJOR DEPRESSIVE DISORDER, SINGLE EPISODE, MODERATE:  - Acknowledged worsening of patient's symptoms, including pain and " discomfort in chest and back area.  - Prescribed muscle relaxants for pain management.  - Increased blood pressure medication dosage and added a new combination medication.  - For further evaluation, ordered CT and HIDA scan, and referred patient to a cardiologist for specialized assessment.    ESSENTIAL HYPERTENSION:  - Increased lisinopril dosage from 10 mg to 20 mg daily due to consistently high blood pressure readings.  - Instructed patient to take two 10 mg tablets until supply runs out, then start 20 mg tablets.  - Prescribed new combination medication: lisinopril with hydrochlorothiazide to replace current lisinopril.  - Scheduled follow up in 4 weeks to reassess blood pressure with new medication regimen.    CHEST PAIN:  - Noted pain located in ribcage area and associated with breathing.    SHORTNESS OF BREATH:  - Noted patient's report of difficulty breathing, associated with chest pain.  - CT of chest will also investigate cause of breathing issues.    CHRONIC COUGH:  - Noted patient's report of having a cough.    HYPERGLYCERIDEMIA:  - Evaluated labs showing slightly elevated triglycerides.  - Provided dietary advice to address this, including reducing fat and sugar intake.  - Mr. Allison instructed to reduce intake of fatty fried foods and limit sugar consumption.    PROTEINURIA:  - Explained to patient that trace protein in urine is not very concerning based on urine test results.    GAS PAIN:  - Noted patient's report of painful gas-like symptoms.  - Discontinued previous ineffective gas medication.         1. Right upper quadrant pain  - NM Hepatobiliary Imaging with GB (HIDA); Future    2. Right-sided chest pain  - CT Chest With Contrast; Future  - cyclobenzaprine (FLEXERIL) 10 MG tablet; Take 1 tablet (10 mg total) by mouth 3 (three) times daily as needed.  Dispense: 30 tablet; Refill: 0  - Ambulatory referral/consult to Cardiology; Future    3. Primary hypertension  - lisinopriL-hydrochlorothiazide  (PRINZIDE,ZESTORETIC) 20-25 mg Tab; Take 1 tablet by mouth once daily.  Dispense: 90 tablet; Refill: 3      Immunization History   Administered Date(s) Administered    Hepatitis A / Hepatitis B 02/10/2010    Hepatitis A, Adult 10/31/2011    Hepatitis B, Adult 08/21/2015    Hepatitis B, Pediatric/Adolescent 01/15/2015, 02/16/2015    Tdap 02/23/2017       Orders Placed This Encounter   Procedures    NM Hepatobiliary Imaging with GB (HIDA)    CT Chest With Contrast    Ambulatory referral/consult to Cardiology       Portions of this note were generated by [employer].    Each patient to whom medical services by telemedicine are provided:  (1) informed of the relationship between the physician and patient and the respective role of any other health care provider with respect to management of the patient; and (2) notified that he or she may decline to receive medical services by telemedicine and may withdraw from such care at any time.    I spent a total of 32 minutes face to face and non-face to face on the date of this visit.This includes time preparing to see the patient (eg, review of tests, notes), obtaining and/or reviewing additional history from an independent historian and/or outside medical records, documenting clinical information in the electronic health record, independently interpreting results and/or communicating results to the patient/family/caregiver, or care coordinator.  Visit today included increased complexity associated with the care of the episodic problem addressed and managing the longitudinal care of the patient due to the serious and/or complex managed problem(s).      This note was generated with the assistance of ambient listening technology. Verbal consent was obtained by the patient and accompanying visitor(s) for the recording of patient appointment to facilitate this note. I attest to having reviewed and edited the generated note for accuracy, though some syntax or spelling errors may  persist. Please contact the author of this note for any clarification.      Sherrill Stevens MD         [1]   Current Outpatient Medications on File Prior to Visit   Medication Sig Dispense Refill    HYDROcodone-acetaminophen (NORCO) 7.5-325 mg per tablet Take 1 tablet by mouth every 6 (six) hours as needed for Pain. 12 tablet 0    hydrocortisone 1 % cream Apply topically 2 (two) times daily. 56 g 2    metoprolol succinate (TOPROL-XL) 25 MG 24 hr tablet Take 1 tablet (25 mg total) by mouth every morning. 90 tablet 3    pantoprazole (PROTONIX) 40 MG tablet Take 1 tablet (40 mg total) by mouth once daily. 90 tablet 3    simethicone (GAS RELIEF, SIMETHICONE,) 80 MG chewable tablet Take 1 tablet (80 mg total) by mouth every 6 (six) hours as needed for Flatulence. 60 tablet 0    [DISCONTINUED] lisinopriL 10 MG tablet Take 1 tablet (10 mg total) by mouth every morning. 90 tablet 3     No current facility-administered medications on file prior to visit.

## 2025-03-20 ENCOUNTER — HOSPITAL ENCOUNTER (OUTPATIENT)
Dept: PREADMISSION TESTING | Facility: HOSPITAL | Age: 60
Discharge: HOME | End: 2025-03-20
Attending: COLON & RECTAL SURGERY
Payer: COMMERCIAL

## 2025-03-20 DIAGNOSIS — Z86.0100 HISTORY OF COLON POLYPS: Primary | ICD-10-CM

## 2025-03-20 RX ORDER — POLYETHYLENE GLYCOL 3350, SODIUM SULFATE ANHYDROUS, SODIUM BICARBONATE, SODIUM CHLORIDE, POTASSIUM CHLORIDE 236; 22.74; 6.74; 5.86; 2.97 G/4L; G/4L; G/4L; G/4L; G/4L
4 POWDER, FOR SOLUTION ORAL ONCE
Qty: 4000 ML | Refills: 0 | Status: SHIPPED | OUTPATIENT
Start: 2025-03-20 | End: 2025-03-20

## 2025-03-21 ENCOUNTER — HOSPITAL ENCOUNTER (OUTPATIENT)
Dept: RADIOLOGY | Facility: HOSPITAL | Age: 60
Discharge: HOME | End: 2025-03-21
Payer: COMMERCIAL

## 2025-03-21 DIAGNOSIS — R07.9 RIGHT-SIDED CHEST PAIN: ICD-10-CM

## 2025-03-21 PROCEDURE — 71260 CT THORAX DX C+: CPT | Mod: 26,,, | Performed by: STUDENT IN AN ORGANIZED HEALTH CARE EDUCATION/TRAINING PROGRAM

## 2025-03-21 PROCEDURE — 25500020 PHARM REV CODE 255

## 2025-03-21 PROCEDURE — 71260 CT THORAX DX C+: CPT | Mod: TC

## 2025-03-21 RX ADMIN — IOHEXOL 100 ML: 350 INJECTION, SOLUTION INTRAVENOUS at 02:03

## 2025-03-27 ENCOUNTER — TELEPHONE (OUTPATIENT)
Dept: CARDIOLOGY | Facility: CLINIC | Age: 60
End: 2025-03-27
Payer: COMMERCIAL

## 2025-03-27 NOTE — TELEPHONE ENCOUNTER
Just fyi  The pt was scheduled for a cardiology appt and he cancelled the appt. Called to see if we could rs the appt and pt refused appt thank you pbr

## 2025-04-02 NOTE — TELEPHONE ENCOUNTER
----- Message from Felicitas Jay sent at 5/13/2024 11:56 AM CDT -----  Contact: MAJO INGRAM [[id number]]  ..Type:  Patient Requesting Call    Who Called: MAJO INGRAM [[id number]]  Does the patient know what this is regarding?: pt requesting script for Linzess  Would the patient rather a call back or a response via [facility]?  call  Best Call Back Number: .[phone] (home)   Additional Information:     .[id number] - [place], [place] - [address] AT [facility] [address]  [address]  [place] [address]  Phone: [phone] Fax: [phone]  
Called # listed, no answer. LMOM requesting return call. Appt scheduled for Wednesday with .   
MOY drain/Vascular access device

## 2025-04-10 ENCOUNTER — LAB VISIT (OUTPATIENT)
Dept: LAB | Facility: HOSPITAL | Age: 60
End: 2025-04-10
Attending: FAMILY MEDICINE
Payer: COMMERCIAL

## 2025-04-10 ENCOUNTER — OFFICE VISIT (OUTPATIENT)
Dept: INTERNAL MEDICINE | Facility: CLINIC | Age: 60
End: 2025-04-10
Payer: COMMERCIAL

## 2025-04-10 ENCOUNTER — PATIENT MESSAGE (OUTPATIENT)
Dept: INTERNAL MEDICINE | Facility: CLINIC | Age: 60
End: 2025-04-10

## 2025-04-10 VITALS
OXYGEN SATURATION: 98 % | BODY MASS INDEX: 24.92 KG/M2 | DIASTOLIC BLOOD PRESSURE: 85 MMHG | TEMPERATURE: 97 F | HEIGHT: 71 IN | WEIGHT: 178 LBS | HEART RATE: 89 BPM | SYSTOLIC BLOOD PRESSURE: 130 MMHG

## 2025-04-10 DIAGNOSIS — R10.11 RIGHT UPPER QUADRANT PAIN: Primary | ICD-10-CM

## 2025-04-10 DIAGNOSIS — R10.11 RIGHT UPPER QUADRANT PAIN: ICD-10-CM

## 2025-04-10 DIAGNOSIS — R39.198 DIFFICULTY URINATING: ICD-10-CM

## 2025-04-10 DIAGNOSIS — M54.6 PAIN IN THORACIC SPINE: ICD-10-CM

## 2025-04-10 LAB
ABSOLUTE EOSINOPHIL (OHS): 0.1 K/UL
ABSOLUTE MONOCYTE (OHS): 0.39 K/UL (ref 0.3–1)
ABSOLUTE NEUTROPHIL COUNT (OHS): 2.3 K/UL (ref 1.8–7.7)
ALBUMIN SERPL BCP-MCNC: 4.1 G/DL (ref 3.5–5.2)
ALP SERPL-CCNC: 62 UNIT/L (ref 40–150)
ALT SERPL W/O P-5'-P-CCNC: 31 UNIT/L (ref 10–44)
ANION GAP (OHS): 9 MMOL/L (ref 8–16)
AST SERPL-CCNC: 25 UNIT/L (ref 11–45)
BASOPHILS # BLD AUTO: 0.02 K/UL
BASOPHILS NFR BLD AUTO: 0.4 %
BILIRUB SERPL-MCNC: 0.5 MG/DL (ref 0.1–1)
BUN SERPL-MCNC: 14 MG/DL (ref 6–20)
CALCIUM SERPL-MCNC: 9.9 MG/DL (ref 8.7–10.5)
CHLORIDE SERPL-SCNC: 106 MMOL/L (ref 95–110)
CHOLEST SERPL-MCNC: 195 MG/DL (ref 120–199)
CHOLEST/HDLC SERPL: 3.8 {RATIO} (ref 2–5)
CO2 SERPL-SCNC: 28 MMOL/L (ref 23–29)
CREAT SERPL-MCNC: 1.2 MG/DL (ref 0.5–1.4)
EAG (OHS): 88 MG/DL (ref 68–131)
ERYTHROCYTE [DISTWIDTH] IN BLOOD BY AUTOMATED COUNT: 12.2 % (ref 11.5–14.5)
GFR SERPLBLD CREATININE-BSD FMLA CKD-EPI: >60 ML/MIN/1.73/M2
GLUCOSE SERPL-MCNC: 78 MG/DL (ref 70–110)
HBA1C MFR BLD: 4.7 % (ref 4–5.6)
HCT VFR BLD AUTO: 46.2 % (ref 40–54)
HDLC SERPL-MCNC: 51 MG/DL (ref 40–75)
HDLC SERPL: 26.2 % (ref 20–50)
HGB BLD-MCNC: 15 GM/DL (ref 14–18)
IMM GRANULOCYTES # BLD AUTO: 0.01 K/UL (ref 0–0.04)
IMM GRANULOCYTES NFR BLD AUTO: 0.2 % (ref 0–0.5)
LDLC SERPL CALC-MCNC: 125 MG/DL (ref 63–159)
LYMPHOCYTES # BLD AUTO: 1.85 K/UL (ref 1–4.8)
MCH RBC QN AUTO: 29.9 PG (ref 27–31)
MCHC RBC AUTO-ENTMCNC: 32.5 G/DL (ref 32–36)
MCV RBC AUTO: 92 FL (ref 82–98)
NONHDLC SERPL-MCNC: 144 MG/DL
NUCLEATED RBC (/100WBC) (OHS): 0 /100 WBC
PLATELET # BLD AUTO: 174 K/UL (ref 150–450)
PMV BLD AUTO: 11.7 FL (ref 9.2–12.9)
POTASSIUM SERPL-SCNC: 4.6 MMOL/L (ref 3.5–5.1)
PROT SERPL-MCNC: 8 GM/DL (ref 6–8.4)
PSA SERPL-MCNC: 0.79 NG/ML
RBC # BLD AUTO: 5.01 M/UL (ref 4.6–6.2)
RELATIVE EOSINOPHIL (OHS): 2.1 %
RELATIVE LYMPHOCYTE (OHS): 39.6 % (ref 18–48)
RELATIVE MONOCYTE (OHS): 8.4 % (ref 4–15)
RELATIVE NEUTROPHIL (OHS): 49.3 % (ref 38–73)
SODIUM SERPL-SCNC: 143 MMOL/L (ref 136–145)
T4 FREE SERPL-MCNC: 0.98 NG/DL (ref 0.71–1.51)
TRIGL SERPL-MCNC: 95 MG/DL (ref 30–150)
TSH SERPL-ACNC: 0.23 UIU/ML (ref 0.4–4)
WBC # BLD AUTO: 4.67 K/UL (ref 3.9–12.7)

## 2025-04-10 PROCEDURE — 3075F SYST BP GE 130 - 139MM HG: CPT | Mod: CPTII,S$GLB,, | Performed by: FAMILY MEDICINE

## 2025-04-10 PROCEDURE — 99214 OFFICE O/P EST MOD 30 MIN: CPT | Mod: S$GLB,,, | Performed by: FAMILY MEDICINE

## 2025-04-10 PROCEDURE — 84443 ASSAY THYROID STIM HORMONE: CPT

## 2025-04-10 PROCEDURE — 36415 COLL VENOUS BLD VENIPUNCTURE: CPT | Mod: PN

## 2025-04-10 PROCEDURE — 85025 COMPLETE CBC W/AUTO DIFF WBC: CPT

## 2025-04-10 PROCEDURE — 84153 ASSAY OF PSA TOTAL: CPT

## 2025-04-10 PROCEDURE — 3008F BODY MASS INDEX DOCD: CPT | Mod: CPTII,S$GLB,, | Performed by: FAMILY MEDICINE

## 2025-04-10 PROCEDURE — 3079F DIAST BP 80-89 MM HG: CPT | Mod: CPTII,S$GLB,, | Performed by: FAMILY MEDICINE

## 2025-04-10 PROCEDURE — 80053 COMPREHEN METABOLIC PANEL: CPT

## 2025-04-10 PROCEDURE — 80061 LIPID PANEL: CPT

## 2025-04-10 PROCEDURE — 99999 PR PBB SHADOW E&M-EST. PATIENT-LVL V: CPT | Mod: PBBFAC,,, | Performed by: FAMILY MEDICINE

## 2025-04-10 PROCEDURE — 4010F ACE/ARB THERAPY RXD/TAKEN: CPT | Mod: CPTII,S$GLB,, | Performed by: FAMILY MEDICINE

## 2025-04-10 PROCEDURE — 84439 ASSAY OF FREE THYROXINE: CPT

## 2025-04-10 PROCEDURE — 3044F HG A1C LEVEL LT 7.0%: CPT | Mod: CPTII,S$GLB,, | Performed by: FAMILY MEDICINE

## 2025-04-10 PROCEDURE — 83036 HEMOGLOBIN GLYCOSYLATED A1C: CPT

## 2025-04-10 PROCEDURE — G2211 COMPLEX E/M VISIT ADD ON: HCPCS | Mod: S$GLB,,, | Performed by: FAMILY MEDICINE

## 2025-04-10 RX ORDER — TIZANIDINE 2 MG/1
4 TABLET ORAL EVERY 8 HOURS PRN
Qty: 30 TABLET | Refills: 0 | Status: SHIPPED | OUTPATIENT
Start: 2025-04-10 | End: 2025-04-21

## 2025-04-11 ENCOUNTER — RESULTS FOLLOW-UP (OUTPATIENT)
Dept: INTERNAL MEDICINE | Facility: CLINIC | Age: 60
End: 2025-04-11

## 2025-04-14 ENCOUNTER — HOSPITAL ENCOUNTER (OUTPATIENT)
Dept: RADIOLOGY | Facility: HOSPITAL | Age: 60
Discharge: HOME | End: 2025-04-14
Payer: COMMERCIAL

## 2025-04-14 DIAGNOSIS — R10.11 RIGHT UPPER QUADRANT PAIN: ICD-10-CM

## 2025-04-14 PROCEDURE — A9537 TC99M MEBROFENIN: HCPCS

## 2025-04-14 PROCEDURE — 78226 HEPATOBILIARY SYSTEM IMAGING: CPT | Mod: 26,,, | Performed by: RADIOLOGY

## 2025-04-14 PROCEDURE — 63600175 PHARM REV CODE 636 W HCPCS

## 2025-04-14 PROCEDURE — 78226 HEPATOBILIARY SYSTEM IMAGING: CPT | Mod: TC

## 2025-04-14 RX ORDER — KIT FOR THE PREPARATION OF TECHNETIUM TC 99M MEBROFENIN 45 MG/10ML
5.2 INJECTION, POWDER, LYOPHILIZED, FOR SOLUTION INTRAVENOUS
Status: COMPLETED | OUTPATIENT
Start: 2025-04-14 | End: 2025-04-14

## 2025-04-14 RX ORDER — SINCALIDE 5 UG/5ML
0.02 INJECTION, POWDER, LYOPHILIZED, FOR SOLUTION INTRAVENOUS ONCE
Status: COMPLETED | OUTPATIENT
Start: 2025-04-14 | End: 2025-04-14

## 2025-04-14 RX ADMIN — MEBROFENIN 5.2 MILLICURIE: 45 INJECTION, POWDER, LYOPHILIZED, FOR SOLUTION INTRAVENOUS at 10:04

## 2025-04-14 RX ADMIN — SINCALIDE 1.6 MCG: 5 INJECTION, POWDER, LYOPHILIZED, FOR SOLUTION INTRAVENOUS at 11:04

## 2025-04-21 ENCOUNTER — OFFICE VISIT (OUTPATIENT)
Dept: INTERNAL MEDICINE | Facility: CLINIC | Age: 60
End: 2025-04-21
Payer: COMMERCIAL

## 2025-04-21 VITALS
BODY MASS INDEX: 25.8 KG/M2 | OXYGEN SATURATION: 96 % | WEIGHT: 184.31 LBS | TEMPERATURE: 96 F | HEART RATE: 56 BPM | SYSTOLIC BLOOD PRESSURE: 138 MMHG | DIASTOLIC BLOOD PRESSURE: 82 MMHG | HEIGHT: 71 IN

## 2025-04-21 DIAGNOSIS — R79.89 LOW TSH LEVEL: ICD-10-CM

## 2025-04-21 DIAGNOSIS — M54.6 PAIN IN THORACIC SPINE: ICD-10-CM

## 2025-04-21 DIAGNOSIS — I10 PRIMARY HYPERTENSION: Primary | ICD-10-CM

## 2025-04-21 PROCEDURE — 99214 OFFICE O/P EST MOD 30 MIN: CPT | Mod: S$GLB,,,

## 2025-04-21 PROCEDURE — 3044F HG A1C LEVEL LT 7.0%: CPT | Mod: CPTII,S$GLB,,

## 2025-04-21 PROCEDURE — 99999 PR PBB SHADOW E&M-EST. PATIENT-LVL III: CPT | Mod: PBBFAC,,,

## 2025-04-21 PROCEDURE — 4010F ACE/ARB THERAPY RXD/TAKEN: CPT | Mod: CPTII,S$GLB,,

## 2025-04-21 PROCEDURE — G2211 COMPLEX E/M VISIT ADD ON: HCPCS | Mod: S$GLB,,,

## 2025-04-21 PROCEDURE — 3079F DIAST BP 80-89 MM HG: CPT | Mod: CPTII,S$GLB,,

## 2025-04-21 PROCEDURE — 3008F BODY MASS INDEX DOCD: CPT | Mod: CPTII,S$GLB,,

## 2025-04-21 PROCEDURE — 3075F SYST BP GE 130 - 139MM HG: CPT | Mod: CPTII,S$GLB,,

## 2025-04-21 RX ORDER — CYCLOBENZAPRINE HCL 5 MG
5 TABLET ORAL 3 TIMES DAILY PRN
Qty: 30 TABLET | Refills: 0 | Status: SHIPPED | OUTPATIENT
Start: 2025-04-21 | End: 2025-05-01

## 2025-04-21 NOTE — PROGRESS NOTES
Patient ID: Zane Allison is a 59 y.o. male.    Chief Complaint: Follow-up    History of Present Illness    CHIEF COMPLAINT:  - Mr. Allison presents with ongoing concerns about pain in his side and for follow-up on recent diagnostic tests and blood pressure management.    HPI:  Mr. Allison reports persistent pain in his side, exacerbated by deep breathing. He also mentions some gas. His blood pressure has been in the higher range during previous visits, but appears to be better controlled today. He continues to take 2 blood pressure medications. He was previously prescribed Pantoprazole (Protonix) for acid reflux, but reports not taking it and not having significant acid reflux symptoms. He has undergone several diagnostic tests, including an x-ray of the spine, a CT of the chest, and has an MRI of the back ordered. His liver has been evaluated and appears to be fine. He mentions trying Zanaflex (Tizanidine), a muscle relaxant, in the past, but its effectiveness is unclear. He reports a history of taking medication for depression for an extended period in the past, but is not currently on any antidepressants.    He denies current symptoms of acid reflux, increased bloating, or distention.      ROS:  General: -fever, -chills, -fatigue, -weight gain, -weight loss  Eyes: -vision changes, -redness, -discharge  ENT: -ear pain, -nasal congestion, -sore throat  Cardiovascular: -chest pain, -palpitations, -lower extremity edema  Respiratory: -cough, -shortness of breath  Gastrointestinal: -abdominal pain, -nausea, -vomiting, -diarrhea, -constipation, -blood in stool  Genitourinary: -dysuria, -hematuria, -frequency  Musculoskeletal: -joint pain, -muscle pain  Skin: -rash, -lesion  Neurological: -headache, -dizziness, -numbness, -tingling  Psychiatric: -anxiety, -depression, -sleep difficulty         Pmh, Psh, Family Hx, Social Hx updated in Epic Tabs today.         4/21/2025     7:17 AM 10/9/2024    11:27 AM 1/11/2023      7:54 AM 1/3/2022     7:27 AM 11/5/2021     6:59 AM   Depression Patient Health Questionnaire   Over the last two weeks how often have you been bothered by little interest or pleasure in doing things Not at all More than half the days Not at all  Not at all  Not at all    Over the last two weeks how often have you been bothered by feeling down, depressed or hopeless Not at all Several days Not at all Not at all  Not at all    PHQ-2 Total Score 0 3 0 0 0   Over the last two weeks how often have you been bothered by trouble falling or staying asleep, or sleeping too much  Nearly every day      Over the last two weeks how often have you been bothered by feeling tired or having little energy  Nearly every day      Over the last two weeks how often have you been bothered by a poor appetite or overeating  Nearly every day      Over the last two weeks how often have you been bothered by feeling bad about yourself - or that you are a failure or have let yourself or your family down  Not at all      Over the last two weeks how often have you been bothered by trouble concentrating on things, such as reading the newspaper or watching television  Not at all      Over the last two weeks how often have you been bothered by moving or speaking so slowly that other people could have noticed. Or the opposite - being so fidgety or restless that you have been moving around a lot more than usual.  Not at all      Over the last two weeks how often have you been bothered by thoughts that you would be better off dead, or of hurting yourself  Not at all      If you checked off any problems, how difficult have these problems made it for you to do your work, take care of things at home or get along with other people?  Not difficult at all      PHQ-9 Score  12      PHQ-9 Interpretation  Moderate          Data saved with a previous flowsheet row definition       Active Problem List with Overview Notes    Diagnosis Date Noted    Major depressive  disorder, single episode, unspecified     History of colon polyps 2022    Hypertension 2022       Past Medical History:   Diagnosis Date    History of colon polyps     History of hepatitis C     treated  with complete remission    Hypertension     Major depressive disorder, single episode, unspecified        Past Surgical History:   Procedure Laterality Date    ANKLE SURGERY      COLONOSCOPY N/A 2017    Procedure: COLONOSCOPY;  Surgeon: Jose Alcocer MD;  Location: [facility] ENDO;  Service: Endoscopy;  Laterality: N/A;    COLONOSCOPY N/A 2021    Procedure: COLONOSCOPY;  Surgeon: Evette Sanchez MD;  Location: [facility] ENDO;  Service: Endoscopy;  Laterality: N/A;    FRACTURE SURGERY  2017    ankle    injury      left eye       Family History   Problem Relation Name Age of Onset    Thyroid disease Mother Aubree Allison     Hypertension Mother Aubree Allison     Alcohol abuse Father Kobi Allison     Hypertension Father Kobi Allison     Diabetes Maternal Aunt      Hypertension Maternal Aunt      Thyroid disease Maternal Aunt      Hypertension Maternal Uncle      Cataracts Maternal Uncle      Alcohol abuse Brother Kobi Allison Jr        Social History     Socioeconomic History    Marital status:    Tobacco Use    Smoking status: Light Smoker     Current packs/day: 0.00     Average packs/day: 1 pack/day for 26.4 years (26.4 ttl pk-yrs)     Types: Cigarettes     Start date: 1993     Last attempt to quit: 2019     Years since quittin.8    Smokeless tobacco: Never   Substance and Sexual Activity    Alcohol use: Yes     Alcohol/week: 1.0 standard drink of alcohol     Types: 1 Cans of beer per week     Comment: once a week    Drug use: No    Sexual activity: Yes     Partners: Female     Birth control/protection: None   Social History Narrative    ** Merged History Encounter **          Social Drivers of Health     Financial Resource Strain: Low Risk  (12/3/2020)    Overall Financial  Resource Strain (CARDIA)     Difficulty of Paying Living Expenses: Not hard at all   Food Insecurity: No Food Insecurity (5/15/2024)    Hunger Vital Sign     Worried About Running Out of Food in the Last Year: Never true     Ran Out of Food in the Last Year: Never true   Transportation Needs: No Transportation Needs (5/15/2024)    PRAPARE - Transportation     Lack of Transportation (Medical): No     Lack of Transportation (Non-Medical): No   Physical Activity: Inactive (5/15/2024)    Exercise Vital Sign     Days of Exercise per Week: 0 days     Minutes of Exercise per Session: 0 min   Stress: Stress Concern Present (5/15/2024)    [employer] - Occupational Stress Questionnaire     Feeling of Stress : To some extent   Housing Stability: Unknown (5/15/2024)    Housing Stability Vital Sign     Unable to Pay for Housing in the Last Year: No       Medications Ordered Prior to Encounter[1]    Review of patient's allergies indicates:  No Known Allergies    General - Well developed, alert and oriented in NAD  HEENT - normocephalic, no evidence of trauma, sclera white, EOMI  Neck - full range of motion  COR - regular rate and rhythm without murmurs or gallops  Lungs - Clear  Abdomen - soft, non-tender  Ext - no cyanosis or edema     Assessment:     1. Primary hypertension    2. Pain in thoracic spine    3. Low TSH level        Pertinent Labs:    Chemistry        Component Value Date/Time     04/10/2025 1211     01/24/2025 1437    K 4.6 04/10/2025 1211    K 3.9 01/24/2025 1437     04/10/2025 1211     01/24/2025 1437    CO2 28 04/10/2025 1211    CO2 28 01/24/2025 1437    BUN 14 04/10/2025 1211    CREATININE 1.2 04/10/2025 1211    GLU 83 01/24/2025 1437        Component Value Date/Time    CALCIUM 9.9 04/10/2025 1211    CALCIUM 9.8 01/24/2025 1437    ALKPHOS 62 04/10/2025 1211    ALKPHOS 58 01/24/2025 1437    AST 25 04/10/2025 1211    AST 21 01/24/2025 1437    ALT 31  "04/10/2025 1211    ALT 21 01/24/2025 1437    BILITOT 0.5 04/10/2025 1211    BILITOT 0.4 01/24/2025 1437    ESTGFRAFRICA >60.0 11/05/2021 0740    EGFRNONAA >60.0 11/05/2021 0740          Lab Results   Component Value Date    WBC 4.67 04/10/2025    HGB 15.0 04/10/2025    HCT 46.2 04/10/2025    MCV 92 04/10/2025    MCH 29.9 04/10/2025    MCHC 32.5 04/10/2025    RDW 12.2 04/10/2025     04/10/2025    MPV 11.7 04/10/2025    NEUTROABS 2.2 06/05/2024    LYMPHOPCT 47 06/05/2024       Lab Results   Component Value Date    HGBA1C 4.7 04/10/2025    HGBA1C 4.9 01/24/2025    HGBA1C 4.8 02/25/2022    GLU 83 01/24/2025     Lab Results   Component Value Date    LDLCALC 90.2 01/24/2025     Lab Results   Component Value Date    TSH 0.234 (L) 04/10/2025     Lab Results   Component Value Date    CHOL 195 04/10/2025    CHOL 170 01/24/2025    CHOL 185 10/10/2024     Lab Results   Component Value Date    TRIG 95 04/10/2025    TRIG 209 (H) 01/24/2025    TRIG 98 10/10/2024     Lab Results   Component Value Date    HDL 51 04/10/2025    HDL 38 (L) 01/24/2025    HDL 42 10/10/2024     Lab Results   Component Value Date    LDLCALC 90.2 01/24/2025    LDLCALC 123.4 10/10/2024    LDLCALC 122.8 01/11/2023     No results found for: "NONHDLC"  Lab Results   Component Value Date    CHOLHDL 26.2 04/10/2025    CHOLHDL 22.4 01/24/2025    CHOLHDL 22.7 10/10/2024       The 10-year ASCVD risk score (Idalmis AYALA, et al., 2019) is: 22.1%    Values used to calculate the score:      Age: 59 years      Sex: Male      Is Non- : Yes      Diabetic: No      Tobacco smoker: Yes      Systolic Blood Pressure: 138 mmHg      Is BP treated: Yes      HDL Cholesterol: 51 mg/dL      Total Cholesterol: 195 mg/dL    Plan:     Assessment & Plan    BP has improved.  Previous CT Chest and CT Liver were normal.  Ruled out depression as contributing factor based on stable mood.  Will investigate potential thyroid issues as possible cause of " symptoms.    MAJOR DEPRESSIVE DISORDER, SINGLE EPISODE, MODERATE:  - Evaluated patient's current mood status and history of depression.  - Mr. Allison reports their mood is currently stable and they don't feel they need medication.  - No current treatment for depression is prescribed based on patient's self-assessment.  - Will continue to monitor mood status in future visits.    ESSENTIAL HYPERTENSION:  - Monitored the patient's blood pressure, which was previously in the higher range but is better in the current visit.  - Assessed that the patient's blood pressure is currently controlled.  - Continued the patient's two antihypertensive medications at current doses.    GASTROESOPHAGEAL REFLUX DISEASE:  - Mr. Allison reports not taking Pantoprazole (Protonix) for acid reflux.  - Assessed that the patient may not need acid reflux medication if not experiencing symptoms.  - Recommend discontinuing Pantoprazole to assess if acid reflux symptoms return.  - Instructed the patient to skip 1 day and monitor for symptoms (increased bloating, distention).  - Advised to resume medication if symptoms occur.    CHEST PAIN:  - Evaluated the patient's report of chest pain when taking a deep breath.  - Reviewed CT of chest, which was found to be normal.  - Assessed that the pain might be related to ribcage or back.    MUSCLE SPASM:  - Considered muscle spasm as a possible cause of patient's symptoms.  - Recommend trying a muscle relaxant for symptom relief.  - Prescribed a new muscle relaxant, different from previously tried Zanaflex (Tizanidine).    ABNORMAL DIAGNOSTIC IMAGING FINDINGS:  - Noted that X-ray of spine and MRI were previously ordered.  - Confirmed that MRI of the back is pending scheduling.  - Instructed the patient to contact the office when MRI results are available.  - Plan to review MRI results and follow up with the patient.    OTHER ASSESSMENTS AND PLANS:  - Ordered thyroid function tests.  - Explained to the  patient that thyroid issues can cause symptoms such as elevated blood pressure, palpitations, fatigue, heat intolerance, and high pulse rate.  - Informed patient that thyroid nodules can secrete hormones and potentially cause problems.  - Follow up in 3 months unless new concerns arise.         1. Primary hypertension    2. Pain in thoracic spine  - cyclobenzaprine (FLEXERIL) 5 MG tablet; Take 1 tablet (5 mg total) by mouth 3 (three) times daily as needed for Muscle spasms. Do not drive or operate heavy machinery.  Dispense: 30 tablet; Refill: 0    3. Low TSH level  - Thyroid Stimulating Immunoglobulin; Future  - Thyrotropin Receptor Antibody; Future  - Thyroid Peroxidase Antibody; Future    Hypertension: Lisinopril-hydrochlorothiazide 20-25 mg daily, metoprolol succinate 25 mg daily.    Immunization History   Administered Date(s) Administered    Hepatitis A / Hepatitis B 02/10/2010    Hepatitis A, Adult 10/31/2011    Hepatitis B, Adult 08/21/2015    Hepatitis B, Pediatric/Adolescent 01/15/2015, 02/16/2015    Tdap 02/23/2017       Orders Placed This Encounter   Procedures    Thyroid Stimulating Immunoglobulin    Thyrotropin Receptor Antibody    Thyroid Peroxidase Antibody       Portions of this note were generated by [employer].    Each patient to whom medical services by telemedicine are provided:  (1) informed of the relationship between the physician and patient and the respective role of any other health care provider with respect to management of the patient; and (2) notified that he or she may decline to receive medical services by telemedicine and may withdraw from such care at any time.    I spent a total of 32 minutes face to face and non-face to face on the date of this visit.This includes time preparing to see the patient (eg, review of tests, notes), obtaining and/or reviewing additional history from an independent historian and/or outside medical records, documenting clinical information in the  electronic health record, independently interpreting results and/or communicating results to the patient/family/caregiver, or care coordinator.  Visit today included increased complexity associated with the care of the episodic problem addressed and managing the longitudinal care of the patient due to the serious and/or complex managed problem(s).      This note was generated with the assistance of ambient listening technology. Verbal consent was obtained by the patient and accompanying visitor(s) for the recording of patient appointment to facilitate this note. I attest to having reviewed and edited the generated note for accuracy, though some syntax or spelling errors may persist. Please contact the author of this note for any clarification.      Sherrill Stevens MD         [1]   Current Outpatient Medications on File Prior to Visit   Medication Sig Dispense Refill    lisinopriL-hydrochlorothiazide (PRINZIDE,ZESTORETIC) 20-25 mg Tab Take 1 tablet by mouth once daily. 90 tablet 3    metoprolol succinate (TOPROL-XL) 25 MG 24 hr tablet Take 1 tablet (25 mg total) by mouth every morning. 90 tablet 3    [DISCONTINUED] pantoprazole (PROTONIX) 40 MG tablet Take 1 tablet (40 mg total) by mouth once daily. 90 tablet 3    hydrocortisone 1 % cream Apply topically 2 (two) times daily. (Patient not taking: Reported on 4/21/2025) 56 g 2    [DISCONTINUED] simethicone (GAS RELIEF, SIMETHICONE,) 80 MG chewable tablet Take 1 tablet (80 mg total) by mouth every 6 (six) hours as needed for Flatulence. (Patient not taking: Reported on 4/21/2025) 60 tablet 0    [DISCONTINUED] tiZANidine (ZANAFLEX) 2 MG tablet Take 2 tablets (4 mg total) by mouth every 8 (eight) hours as needed (right side pain). (Patient not taking: Reported on 4/21/2025) 30 tablet 0     No current facility-administered medications on file prior to visit.

## 2025-04-22 ENCOUNTER — LAB VISIT (OUTPATIENT)
Dept: LAB | Facility: HOSPITAL | Age: 60
End: 2025-04-22
Payer: COMMERCIAL

## 2025-04-22 DIAGNOSIS — R79.89 LOW TSH LEVEL: ICD-10-CM

## 2025-04-22 LAB — THYROPEROXIDASE IGG SERPL-ACNC: 19.7 IU/ML

## 2025-04-22 PROCEDURE — 84445 ASSAY OF TSI GLOBULIN: CPT

## 2025-04-22 PROCEDURE — 83520 IMMUNOASSAY QUANT NOS NONAB: CPT

## 2025-04-22 PROCEDURE — 36415 COLL VENOUS BLD VENIPUNCTURE: CPT | Mod: PO

## 2025-04-22 PROCEDURE — 86376 MICROSOMAL ANTIBODY EACH: CPT

## 2025-04-24 LAB — TSH RECEP AB SER-ACNC: <1.1 IU/L (ref 0–1.75)

## 2025-04-25 LAB — W THYROID STIMULATING IG (TSI): <0.1 IU/L

## 2025-04-29 ENCOUNTER — OFFICE VISIT (OUTPATIENT)
Dept: UROLOGY | Facility: CLINIC | Age: 60
End: 2025-04-29
Payer: COMMERCIAL

## 2025-04-29 VITALS
SYSTOLIC BLOOD PRESSURE: 137 MMHG | BODY MASS INDEX: 25.8 KG/M2 | HEIGHT: 71 IN | DIASTOLIC BLOOD PRESSURE: 85 MMHG | HEART RATE: 60 BPM | WEIGHT: 184.31 LBS

## 2025-04-29 DIAGNOSIS — N52.9 ERECTILE DYSFUNCTION, UNSPECIFIED ERECTILE DYSFUNCTION TYPE: Primary | ICD-10-CM

## 2025-04-29 DIAGNOSIS — R39.198 DIFFICULTY URINATING: ICD-10-CM

## 2025-04-29 LAB
BILIRUBIN, UA POC OHS: NEGATIVE
BLOOD, UA POC OHS: ABNORMAL
CLARITY, UA POC OHS: CLEAR
COLOR, UA POC OHS: YELLOW
GLUCOSE, UA POC OHS: NEGATIVE
KETONES, UA POC OHS: NEGATIVE
LEUKOCYTES, UA POC OHS: NEGATIVE
NITRITE, UA POC OHS: NEGATIVE
PH, UA POC OHS: 6
PROTEIN, UA POC OHS: 100
SPECIFIC GRAVITY, UA POC OHS: >=1.03
UROBILINOGEN, UA POC OHS: 0.2

## 2025-04-29 PROCEDURE — 3079F DIAST BP 80-89 MM HG: CPT | Mod: CPTII,S$GLB,, | Performed by: UROLOGY

## 2025-04-29 PROCEDURE — 99999 PR PBB SHADOW E&M-EST. PATIENT-LVL IV: CPT | Mod: PBBFAC,,, | Performed by: UROLOGY

## 2025-04-29 PROCEDURE — 81003 URINALYSIS AUTO W/O SCOPE: CPT | Mod: QW,S$GLB,, | Performed by: UROLOGY

## 2025-04-29 PROCEDURE — 99204 OFFICE O/P NEW MOD 45 MIN: CPT | Mod: S$GLB,,, | Performed by: UROLOGY

## 2025-04-29 PROCEDURE — 4010F ACE/ARB THERAPY RXD/TAKEN: CPT | Mod: CPTII,S$GLB,, | Performed by: UROLOGY

## 2025-04-29 PROCEDURE — 1159F MED LIST DOCD IN RCRD: CPT | Mod: CPTII,S$GLB,, | Performed by: UROLOGY

## 2025-04-29 PROCEDURE — 3008F BODY MASS INDEX DOCD: CPT | Mod: CPTII,S$GLB,, | Performed by: UROLOGY

## 2025-04-29 PROCEDURE — 1160F RVW MEDS BY RX/DR IN RCRD: CPT | Mod: CPTII,S$GLB,, | Performed by: UROLOGY

## 2025-04-29 PROCEDURE — 3044F HG A1C LEVEL LT 7.0%: CPT | Mod: CPTII,S$GLB,, | Performed by: UROLOGY

## 2025-04-29 PROCEDURE — 3075F SYST BP GE 130 - 139MM HG: CPT | Mod: CPTII,S$GLB,, | Performed by: UROLOGY

## 2025-04-29 RX ORDER — SILDENAFIL 50 MG/1
50 TABLET, FILM COATED ORAL DAILY PRN
Qty: 20 TABLET | Refills: 5 | Status: SHIPPED | OUTPATIENT
Start: 2025-04-29 | End: 2026-04-29

## 2025-04-29 NOTE — PROGRESS NOTES
Chief Complaint:  Urinary frequency    HPI:   Zane Allison is a 59 y.o. male that presents today as a referral from Dr. Avila for urinary frequency.  Patient notes this has been going on for 3-6 months, notes about Q 2 hours urinary frequency and notes some urgency as well.  Denies any dysuria for UTIs.  Notes that his stream is intermittently weak.  Currently taking hydrochlorothiazide for blood pressure issues, he feels like there was some correlation.  Not currently on any medications for his prostate and not bothered enough that he wants to take a medication for his urinary symptoms.  Notes ED, was previously taking sildenafil which was helpful, wants a refill.  Thinks that his grandfather had prostate cancer, but denies any other family history.  IPSS - 3/3/4/3/4/2/2 = 21  QOL - 0(delighted)    PMH:  Past Medical History:   Diagnosis Date    History of colon polyps     History of hepatitis C     treated 2016 with complete remission    Hypertension     Major depressive disorder, single episode, unspecified        PSH:  Past Surgical History:   Procedure Laterality Date    ANKLE SURGERY      COLONOSCOPY N/A 11/01/2017    Procedure: COLONOSCOPY;  Surgeon: Jose Alcocer MD;  Location: [facility] ENDO;  Service: Endoscopy;  Laterality: N/A;    COLONOSCOPY N/A 12/28/2021    Procedure: COLONOSCOPY;  Surgeon: Evette Sanchez MD;  Location: [facility] ENDO;  Service: Endoscopy;  Laterality: N/A;    FRACTURE SURGERY  02/2017    ankle    injury      left eye       Family History:  Family History   Problem Relation Name Age of Onset    Thyroid disease Mother Aubree Allison     Hypertension Mother Aubree Allison     Alcohol abuse Father Kobi Allison     Hypertension Father Kobi Allison     Diabetes Maternal Aunt      Hypertension Maternal Aunt      Thyroid disease Maternal Aunt      Hypertension Maternal Uncle      Cataracts Maternal Uncle      Alcohol abuse Brother Kobi Allison         Social History:  Social History[1]      Review of Systems:  General: No fever, chills  Skin: No rashes  Chest:  Denies cough and sputum production  Heart: Denies chest pain  Resp: Denies dyspnea  Abdomen: Denies diarrhea, abdominal pain, hematemesis, or blood in stool.  Musculoskeletal: No joint stiffness or swelling. Denies back pain.  : see HPI  Neuro: no dizziness or weakness    Allergies:  Patient has no known allergies.    Medications:  Current Medications[2]    Physical Exam:  Vitals:    04/29/25 1051   BP: 137/85   Pulse: 60     Body mass index is 25.71 kg/m².  General: awake, alert, cooperative  Head: NC/AT  Ears: external ears normal  Eyes: sclera normal  Lungs: normal inspiration, NAD  Heart: well-perfused  Abdomen: Soft, NT, ND  : Normal uncirc'd phallus, meatus normal in size and position, BL testicles palpable, no masses, nontender, no abnormalities of epididymi  TESS: Normal rectal tone, no hemorrhoids. Prostate smooth and normal, no nodules 40 gm SV not palpable. Perineum and anus normal.  Lymphatic: groin nodes negative  Skin: The skin is warm and dry  Ext: No c/c/e.  Neuro: grossly intact, AOx3    RADIOLOGY:  No recent relevant imaging available for review.    LABS:  I personally reviewed the following lab values:  Lab Results   Component Value Date    WBC 4.67 04/10/2025    HGB 15.0 04/10/2025    HCT 46.2 04/10/2025     04/10/2025     04/10/2025    K 4.6 04/10/2025     04/10/2025    CREATININE 1.2 04/10/2025    BUN 14 04/10/2025    CO2 28 04/10/2025    TSH 0.234 (L) 04/10/2025    PSA 0.79 04/10/2025    HGBA1C 4.7 04/10/2025    CHOL 195 04/10/2025    TRIG 95 04/10/2025    HDL 51 04/10/2025    ALT 31 04/10/2025    AST 25 04/10/2025       URINALYSIS:  Urinalysis obtained in clinic today specific gravity > 1.030 pH 6.0 trace intact blood, positive protein, negative for all other parameters    PVR = 33 mL    Assessment/Plan:   Zane Allison is a 59 y.o. male with:    OAB/LUTS - not currently bothered enough that he  wants to take a medication for his urinary symptoms, continue to monitor    ED - sildenafil Rx, side effects reviewed    Prostate cancer screening - PSA and TESS normal, continue annual screening    - f/u 6 months    Thank you for allowing me the opportunity to participate in this patient's care.     Obdulio Wong MD  Urology           [1]   Social History  Tobacco Use    Smoking status: Light Smoker     Current packs/day: 0.00     Average packs/day: 1 pack/day for 26.4 years (26.4 ttl pk-yrs)     Types: Cigarettes     Start date: 1993     Last attempt to quit: 2019     Years since quittin.9    Smokeless tobacco: Never   Substance Use Topics    Alcohol use: Yes     Alcohol/week: 1.0 standard drink of alcohol     Types: 1 Cans of beer per week     Comment: once a week    Drug use: No   [2]   Current Outpatient Medications:     cyclobenzaprine (FLEXERIL) 5 MG tablet, Take 1 tablet (5 mg total) by mouth 3 (three) times daily as needed for Muscle spasms. Do not drive or operate heavy machinery., Disp: 30 tablet, Rfl: 0    hydrocortisone 1 % cream, Apply topically 2 (two) times daily., Disp: 56 g, Rfl: 2    lisinopriL-hydrochlorothiazide (PRINZIDE,ZESTORETIC) 20-25 mg Tab, Take 1 tablet by mouth once daily., Disp: 90 tablet, Rfl: 3    metoprolol succinate (TOPROL-XL) 25 MG 24 hr tablet, Take 1 tablet (25 mg total) by mouth every morning., Disp: 90 tablet, Rfl: 3    sildenafiL (VIAGRA) 50 MG tablet, Take 1 tablet (50 mg total) by mouth daily as needed for Erectile Dysfunction., Disp: 20 tablet, Rfl: 5

## 2025-05-01 ENCOUNTER — HOSPITAL ENCOUNTER (OUTPATIENT)
Dept: RADIOLOGY | Facility: HOSPITAL | Age: 60
Discharge: HOME | End: 2025-05-01
Attending: FAMILY MEDICINE
Payer: COMMERCIAL

## 2025-05-01 DIAGNOSIS — R10.11 RIGHT UPPER QUADRANT PAIN: ICD-10-CM

## 2025-05-01 DIAGNOSIS — M54.6 PAIN IN THORACIC SPINE: ICD-10-CM

## 2025-05-01 PROCEDURE — 72146 MRI CHEST SPINE W/O DYE: CPT | Mod: 26,,, | Performed by: RADIOLOGY

## 2025-05-01 PROCEDURE — 72146 MRI CHEST SPINE W/O DYE: CPT | Mod: TC,PN

## 2025-05-02 PROBLEM — M54.6 PAIN IN THORACIC SPINE: Status: ACTIVE | Noted: 2025-05-02

## 2025-05-02 PROBLEM — R10.11 RIGHT UPPER QUADRANT PAIN: Status: ACTIVE | Noted: 2025-05-02

## 2025-05-02 NOTE — PROGRESS NOTES
Subjective:       Patient ID: Zane Allison is a 59 y.o. male.    Reason for Visit: right side pain (Right side pain for months, rib cage radiates to flank area)    Presents to clinic with chronic right side pain near his rib cage for the past few months. He has imaging and lab work without an obvious source of his pain. He would like to establish care with me today. Has history of elevated blood pressure but does not take medication on a regular basis. History of Hep C in remission. Has some imaging studies pending scheduling.     Has difficulty with urination as well, denies hematuria, dysuria, penile discharge. Feels like his bladder is not emptying fully         Review of Systems   Constitutional:  Negative for chills and fever.   HENT:  Negative for congestion and sore throat.    Respiratory:  Negative for cough and shortness of breath.    Cardiovascular:  Negative for chest pain, palpitations and leg swelling.   Gastrointestinal:  Negative for abdominal pain, nausea and vomiting.   Genitourinary:  Positive for difficulty urinating. Negative for decreased urine volume, dysuria, frequency, hematuria, penile discharge, penile pain, penile swelling and urgency.   Musculoskeletal:  Positive for back pain.   Neurological:  Negative for headaches.         Medications Ordered Prior to Encounter[1]     Past Medical History:   Diagnosis Date    History of colon polyps     History of hepatitis C     treated 2016 with complete remission    Hypertension     Major depressive disorder, single episode, unspecified         Past Surgical History:   Procedure Laterality Date    ANKLE SURGERY      COLONOSCOPY N/A 11/01/2017    Procedure: COLONOSCOPY;  Surgeon: Jose Alcocer MD;  Location: [facility] ENDO;  Service: Endoscopy;  Laterality: N/A;    COLONOSCOPY N/A 12/28/2021    Procedure: COLONOSCOPY;  Surgeon: Evette Sanchez MD;  Location: [facility] ENDO;  Service: Endoscopy;  Laterality: N/A;    FRACTURE SURGERY  02/2017     ankle    injury      left eye      Objective:      Physical Exam  Vitals reviewed.   HENT:      Head: Normocephalic and atraumatic.   Eyes:      General: No scleral icterus.        Right eye: No discharge.         Left eye: No discharge.      Extraocular Movements: Extraocular movements intact.      Conjunctiva/sclera: Conjunctivae normal.      Pupils: Pupils are equal, round, and reactive to light.   Cardiovascular:      Rate and Rhythm: Normal rate and regular rhythm.      Pulses: Normal pulses.      Heart sounds: Normal heart sounds. No murmur heard.     No friction rub. No gallop.   Pulmonary:      Effort: Pulmonary effort is normal. No respiratory distress.      Breath sounds: Normal breath sounds. No stridor. No wheezing, rhonchi or rales.   Chest:      Chest wall: No tenderness.   Musculoskeletal:         General: Tenderness (thoracic spine) present. No swelling, deformity or signs of injury.      Right lower leg: No edema.      Left lower leg: No edema.   Skin:     General: Skin is warm.      Capillary Refill: Capillary refill takes less than 2 seconds.   Neurological:      General: No focal deficit present.      Mental Status: He is alert.   Psychiatric:         Mood and Affect: Mood normal.         Behavior: Behavior normal.         Assessment:       1. Right upper quadrant pain    2. Difficulty urinating    3. Pain in thoracic spine        Plan:   1. Right upper quadrant pain  Chronic, uncontrolled, concern for spinal injury  -     CBC Auto Differential; Future; Expected date: 04/10/2025  -     Comprehensive Metabolic Panel; Future; Expected date: 04/10/2025  -     Hemoglobin A1C; Future; Expected date: 04/10/2025  -     Lipid Panel; Future; Expected date: 04/10/2025  -     TSH; Future; Expected date: 04/10/2025  -      tiZANidine (ZANAFLEX) 2 MG tablet; Take 2 tablets (4 mg total) by mouth every 8 (eight) hours as needed (right side pain). (Patient not taking: Reported on 4/21/2025)  Dispense: 30 tablet;  Refill: 0  -     X-Ray Thoracic Spine AP Lateral; Future; Expected date: 04/10/2025  -     MRI Thoracic Spine Without Contrast; Future; Expected date: 04/10/2025    2. Difficulty urinating  Chronic, uncontrolled, concern for spinal injury  -     PSA, Screening; Future; Expected date: 04/10/2025  -     Ambulatory referral/consult to Urology; Future; Expected date: 04/17/2025  -     Ambulatory referral/consult to Spine Care; Future; Expected date: 04/17/2025    3. Pain in thoracic spine  Chronic, uncontrolled, concern for spinal injury  -     X-Ray Thoracic Spine AP Lateral; Future; Expected date: 04/10/2025  -     Ambulatory referral/consult to Spine Care; Future; Expected date: 04/17/2025  -     MRI Thoracic Spine Without Contrast; Future; Expected date: 04/10/2025      Follow up in about 1 month (around 5/10/2025) for Follow Up.      Future Appointments   Date Time Provider Department Center   5/8/2025 10:45 AM Franca Quintero MD [facility]   7/21/2025  7:40 AM Sherrill Stevens MD [facility] [place]   10/31/2025 11:30 AM Obdulio Wong MD [facility] UROLOGY [facility]        Felipa Avila MD  [facility] [address] [place] [address]  [place] [address]  [phone]         [1]   Current Outpatient Medications on File Prior to Visit   Medication Sig Dispense Refill    lisinopriL-hydrochlorothiazide (PRINZIDE,ZESTORETIC) 20-25 mg Tab Take 1 tablet by mouth once daily. 90 tablet 3    metoprolol succinate (TOPROL-XL) 25 MG 24 hr tablet Take 1 tablet (25 mg total) by mouth every morning. 90 tablet 3    hydrocortisone 1 % cream Apply topically 2 (two) times daily. 56 g 2     No current facility-administered medications on file prior to visit.

## 2025-05-02 NOTE — PROGRESS NOTES
Pt was scheduled for Tuesday, May 13th at 8:20AM. He was not able to make it for your opening on nest week.

## 2025-05-08 ENCOUNTER — OFFICE VISIT (OUTPATIENT)
Dept: OPHTHALMOLOGY | Facility: CLINIC | Age: 60
End: 2025-05-08
Payer: COMMERCIAL

## 2025-05-08 DIAGNOSIS — H11.001 PTERYGIUM EYE, RIGHT: Primary | ICD-10-CM

## 2025-05-08 DIAGNOSIS — H26.9 CORTICAL CATARACT OF BOTH EYES: ICD-10-CM

## 2025-05-08 PROCEDURE — 92015 DETERMINE REFRACTIVE STATE: CPT | Mod: S$GLB,,, | Performed by: OPHTHALMOLOGY

## 2025-05-08 PROCEDURE — 99999 PR PBB SHADOW E&M-EST. PATIENT-LVL II: CPT | Mod: PBBFAC,,, | Performed by: OPHTHALMOLOGY

## 2025-05-08 PROCEDURE — 92004 COMPRE OPH EXAM NEW PT 1/>: CPT | Mod: S$GLB,,, | Performed by: OPHTHALMOLOGY

## 2025-05-08 PROCEDURE — 4010F ACE/ARB THERAPY RXD/TAKEN: CPT | Mod: CPTII,S$GLB,, | Performed by: OPHTHALMOLOGY

## 2025-05-08 PROCEDURE — 3044F HG A1C LEVEL LT 7.0%: CPT | Mod: CPTII,S$GLB,, | Performed by: OPHTHALMOLOGY

## 2025-05-08 PROCEDURE — 1159F MED LIST DOCD IN RCRD: CPT | Mod: CPTII,S$GLB,, | Performed by: OPHTHALMOLOGY

## 2025-05-08 NOTE — PROGRESS NOTES
HPI     Eye Problem     Additional comments: Np to SSS  Patient here today for pterygium eval OD           Comments    Dry Eyes OU          Last edited by Raya Valentine, PCT on 5/8/2025 11:34 AM.            Assessment /Plan     For exam results, see Encounter Report.    Pterygium eye, right  Not in visual axis, no significant induced astigmatism. Recommended use of frequent artificial tears, UV protection and avoidance of environmental irritants.    Cortical cataract of both eyes   Cataracts are present but not visually significant. Will continue to monitor. Mrx provided.     RTC 1 year sooner if needed

## 2025-05-13 ENCOUNTER — TELEPHONE (OUTPATIENT)
Dept: INTERNAL MEDICINE | Facility: CLINIC | Age: 60
End: 2025-05-13
Payer: COMMERCIAL

## 2025-05-19 ENCOUNTER — OFFICE VISIT (OUTPATIENT)
Dept: INTERNAL MEDICINE | Facility: CLINIC | Age: 60
End: 2025-05-19
Payer: COMMERCIAL

## 2025-05-19 VITALS
SYSTOLIC BLOOD PRESSURE: 118 MMHG | OXYGEN SATURATION: 97 % | WEIGHT: 179.69 LBS | HEART RATE: 67 BPM | BODY MASS INDEX: 25.16 KG/M2 | HEIGHT: 71 IN | RESPIRATION RATE: 18 BRPM | TEMPERATURE: 96 F | DIASTOLIC BLOOD PRESSURE: 70 MMHG

## 2025-05-19 DIAGNOSIS — M48.04 FORAMINAL STENOSIS OF THORACIC REGION: ICD-10-CM

## 2025-05-19 DIAGNOSIS — M54.6 PAIN IN THORACIC SPINE: Primary | ICD-10-CM

## 2025-05-19 DIAGNOSIS — F17.210 CIGARETTE NICOTINE DEPENDENCE WITHOUT COMPLICATION: ICD-10-CM

## 2025-05-19 DIAGNOSIS — Z12.11 SCREENING FOR COLON CANCER: ICD-10-CM

## 2025-05-19 PROCEDURE — 99999 PR PBB SHADOW E&M-EST. PATIENT-LVL IV: CPT | Mod: PBBFAC,,, | Performed by: FAMILY MEDICINE

## 2025-05-19 PROCEDURE — 3044F HG A1C LEVEL LT 7.0%: CPT | Mod: CPTII,S$GLB,, | Performed by: FAMILY MEDICINE

## 2025-05-19 PROCEDURE — G2211 COMPLEX E/M VISIT ADD ON: HCPCS | Mod: S$GLB,,, | Performed by: FAMILY MEDICINE

## 2025-05-19 PROCEDURE — 4010F ACE/ARB THERAPY RXD/TAKEN: CPT | Mod: CPTII,S$GLB,, | Performed by: FAMILY MEDICINE

## 2025-05-19 PROCEDURE — 1159F MED LIST DOCD IN RCRD: CPT | Mod: CPTII,S$GLB,, | Performed by: FAMILY MEDICINE

## 2025-05-19 PROCEDURE — 3008F BODY MASS INDEX DOCD: CPT | Mod: CPTII,S$GLB,, | Performed by: FAMILY MEDICINE

## 2025-05-19 PROCEDURE — 99214 OFFICE O/P EST MOD 30 MIN: CPT | Mod: S$GLB,,, | Performed by: FAMILY MEDICINE

## 2025-05-19 PROCEDURE — 3074F SYST BP LT 130 MM HG: CPT | Mod: CPTII,S$GLB,, | Performed by: FAMILY MEDICINE

## 2025-05-19 PROCEDURE — 3078F DIAST BP <80 MM HG: CPT | Mod: CPTII,S$GLB,, | Performed by: FAMILY MEDICINE

## 2025-05-19 RX ORDER — METHOCARBAMOL 500 MG/1
500 TABLET, FILM COATED ORAL 4 TIMES DAILY
Qty: 40 TABLET | Refills: 0 | Status: SHIPPED | OUTPATIENT
Start: 2025-05-19 | End: 2025-05-29

## 2025-05-20 ENCOUNTER — PATIENT MESSAGE (OUTPATIENT)
Dept: INTERNAL MEDICINE | Facility: CLINIC | Age: 60
End: 2025-05-20
Payer: COMMERCIAL

## 2025-05-25 NOTE — PROGRESS NOTES
Subjective:       Patient ID: Zane Allison is a 59 y.o. male.    Chief Complaint: Back Pain    History of Present Illness    CHIEF COMPLAINT:  Mr. Allison presents for follow-up on multiple health concerns, including eye issues, back pain, and smoking cessation.    HPI:  Mr. Allison has eye problems. An eye doctor at [facility] diagnosed him with cataracts, but not glaucoma. The doctor recommended artificial tears, wearing sunglasses, and avoiding environmental irritants. He goes fishing, which exposes his eyes to reflected light from the water. He reports difficulty finding suitable sunglasses.    He has back pain due to a protruding disc in his upper back. The pain wraps around the spine, suggesting nerve compression. He expresses frustration with persistent side pain. He has undergone imaging studies for this issue.    He has been evaluated by a neurologist for erectile dysfunction (ED).    Headaches now.    He is a smoker trying to quit but finds it challenging, especially during times of stress. He acknowledges that stress exacerbates his smoking habit.    He reports feeling overwhelmed and having difficulty managing stress related to family issues, including concerns about his children and wife.    MEDICAL HISTORY:  Mr. Allison has a history of cataracts and a protruding disc in the upper back.    MEDICATIONS:  Mr. Allison is on eye drops to lower eye pressure.    IMAGING:  An MRI of the upper back was performed, revealing a protruding disc which is likely causing the patient's pain.    SOCIAL HISTORY:  Smoking: Current smoker, trying to quit Occupation: Employed, works with a control board        Review of Systems   Constitutional:  Negative for chills and fever.   HENT:  Negative for congestion and sore throat.    Respiratory:  Negative for cough and shortness of breath.    Cardiovascular:  Negative for chest pain, palpitations and leg swelling.   Gastrointestinal:  Negative for abdominal pain, nausea and  vomiting.   Genitourinary:  Negative for dysuria.   Musculoskeletal:  Positive for back pain.   Neurological:  Negative for headaches.       Objective:      Physical Exam  Vitals reviewed.   Constitutional:       Appearance: Normal appearance.   HENT:      Head: Normocephalic and atraumatic.   Eyes:      General: No scleral icterus.  Cardiovascular:      Rate and Rhythm: Normal rate.   Pulmonary:      Effort: Pulmonary effort is normal. No respiratory distress.   Neurological:      General: No focal deficit present.      Mental Status: He is alert.   Psychiatric:         Mood and Affect: Mood normal.         Behavior: Behavior normal.         Assessment/Plan:       1. Pain in thoracic spine    2. Foraminal stenosis of thoracic region    3. Screening for colon cancer      Assessment & Plan    THORACIC DISC DISORDER WITH RADICULOPATHY:  - Identified protruding disc in the upper back as the likely cause of pain, explaining compression on nerves causing back pain.  - Pain is significant and likely requires more invasive interventions.  - Referred the patient to ortho spine specialist at [facility] for evaluation and potential treatment options including injections or other procedures to relieve pain.    NICOTINE DEPENDENCE:  - Mr. Allison is trying to stop smoking but finds it challenging due to stress.  - Assessed need for smoking cessation intervention due to repeated unsuccessful attempts.  - Advised to stop smoking and considering referral for recurrent intervention to help quit.    STRESS MANAGEMENT:  - Mr. Allison is experiencing stress due to personal and family issues.  - Advised to communicate and express feelings to manage stress.  - Recommend finding alternative stress management techniques.    COLORECTAL CANCER SCREENING:  - Ordered Cologuard screening test instead of colonoscopy for colorectal cancer screening.    FOLLOW-UP:  - Follow up in 1 month.  - Mr. Allison advised to answer calls from  unknown numbers as they may be for appointment scheduling.              Future Appointments   Date Time Provider Department Center   6/19/2025  8:20 AM Felipa Avila MD [facility]   7/21/2025  7:40 AM Sherrill Stevens MD [facility]   10/31/2025 11:30 AM Obdulio Wong MD [facility] UROLOGY [facility]       Felipa Avila MD  [facility] [address]  [place] [address]  [phone]      This note was generated with the assistance of ambient listening technology. Verbal consent was obtained by the patient and accompanying visitor(s) for the recording of patient appointment to facilitate this note. I attest to having reviewed and edited the generated note for accuracy, though some syntax or spelling errors may persist. Please contact the author of this note for any clarification.

## 2025-05-30 LAB — NONINV COLON CA DNA+OCC BLD SCRN STL QL: NEGATIVE

## 2025-05-31 ENCOUNTER — RESULTS FOLLOW-UP (OUTPATIENT)
Dept: INTERNAL MEDICINE | Facility: CLINIC | Age: 60
End: 2025-05-31

## 2025-06-13 DIAGNOSIS — I10 PRIMARY HYPERTENSION: ICD-10-CM

## 2025-06-13 NOTE — TELEPHONE ENCOUNTER
Requested Prescriptions     Pending Prescriptions Disp Refills    lisinopriL-hydrochlorothiazide (PRINZIDE,ZESTORETIC) 20-25 mg Tab 90 tablet 3     Sig: Take 1 tablet by mouth once daily.     LV: 05/19/25 HS  NV: 07/21/25 HS  LF: 03/13/25 HS

## 2025-06-16 RX ORDER — LISINOPRIL AND HYDROCHLOROTHIAZIDE 20; 25 MG/1; MG/1
1 TABLET ORAL DAILY
Qty: 90 TABLET | Refills: 3 | Status: SHIPPED | OUTPATIENT
Start: 2025-06-16 | End: 2026-06-16

## 2025-06-27 ENCOUNTER — LAB VISIT (OUTPATIENT)
Dept: LAB | Facility: HOSPITAL | Age: 60
End: 2025-06-27
Attending: PHYSICIAN ASSISTANT
Payer: COMMERCIAL

## 2025-06-27 DIAGNOSIS — Z01.812 PRE-OPERATIVE LABORATORY EXAMINATION: ICD-10-CM

## 2025-06-27 DIAGNOSIS — M54.14 THORACIC RADICULITIS: Primary | ICD-10-CM

## 2025-06-27 LAB
ABSOLUTE EOSINOPHIL (OHS): 0.14 K/UL
ABSOLUTE MONOCYTE (OHS): 0.65 K/UL (ref 0.3–1)
ABSOLUTE NEUTROPHIL COUNT (OHS): 2.18 K/UL (ref 1.8–7.7)
BASOPHILS # BLD AUTO: 0.04 K/UL
BASOPHILS NFR BLD AUTO: 0.8 %
ERYTHROCYTE [DISTWIDTH] IN BLOOD BY AUTOMATED COUNT: 12.6 % (ref 11.5–14.5)
HCT VFR BLD AUTO: 44.7 % (ref 40–54)
HGB BLD-MCNC: 14 GM/DL (ref 14–18)
IMM GRANULOCYTES # BLD AUTO: 0.01 K/UL (ref 0–0.04)
IMM GRANULOCYTES NFR BLD AUTO: 0.2 % (ref 0–0.5)
LYMPHOCYTES # BLD AUTO: 2.17 K/UL (ref 1–4.8)
MCH RBC QN AUTO: 30 PG (ref 27–31)
MCHC RBC AUTO-ENTMCNC: 31.3 G/DL (ref 32–36)
MCV RBC AUTO: 96 FL (ref 82–98)
NUCLEATED RBC (/100WBC) (OHS): 0 /100 WBC
PLATELET # BLD AUTO: 137 K/UL (ref 150–450)
PMV BLD AUTO: 12.2 FL (ref 9.2–12.9)
RBC # BLD AUTO: 4.66 M/UL (ref 4.6–6.2)
RELATIVE EOSINOPHIL (OHS): 2.7 %
RELATIVE LYMPHOCYTE (OHS): 41.8 % (ref 18–48)
RELATIVE MONOCYTE (OHS): 12.5 % (ref 4–15)
RELATIVE NEUTROPHIL (OHS): 42 % (ref 38–73)
WBC # BLD AUTO: 5.19 K/UL (ref 3.9–12.7)

## 2025-06-27 PROCEDURE — 85025 COMPLETE CBC W/AUTO DIFF WBC: CPT

## 2025-06-27 PROCEDURE — 85610 PROTHROMBIN TIME: CPT

## 2025-06-27 PROCEDURE — 85730 THROMBOPLASTIN TIME PARTIAL: CPT

## 2025-06-27 PROCEDURE — 36415 COLL VENOUS BLD VENIPUNCTURE: CPT | Mod: PN

## 2025-07-01 LAB
APTT PPP: 26.2 SECONDS (ref 21–32)
INR PPP: 1 (ref 0.8–1.2)
PROTHROMBIN TIME: 11.3 SECONDS (ref 9–12.5)

## 2025-07-02 ENCOUNTER — OFFICE VISIT (OUTPATIENT)
Dept: INTERNAL MEDICINE | Facility: CLINIC | Age: 60
End: 2025-07-02
Payer: COMMERCIAL

## 2025-07-02 ENCOUNTER — LAB VISIT (OUTPATIENT)
Dept: LAB | Facility: HOSPITAL | Age: 60
End: 2025-07-02
Attending: FAMILY MEDICINE
Payer: COMMERCIAL

## 2025-07-02 VITALS
DIASTOLIC BLOOD PRESSURE: 80 MMHG | HEIGHT: 71 IN | OXYGEN SATURATION: 96 % | HEART RATE: 67 BPM | BODY MASS INDEX: 25.52 KG/M2 | SYSTOLIC BLOOD PRESSURE: 125 MMHG | WEIGHT: 182.31 LBS | TEMPERATURE: 95 F

## 2025-07-02 DIAGNOSIS — F17.210 CIGARETTE NICOTINE DEPENDENCE WITHOUT COMPLICATION: ICD-10-CM

## 2025-07-02 DIAGNOSIS — R53.82 CHRONIC FATIGUE: Primary | ICD-10-CM

## 2025-07-02 DIAGNOSIS — E06.3 HASHIMOTO THYROIDITIS: ICD-10-CM

## 2025-07-02 DIAGNOSIS — R53.82 CHRONIC FATIGUE: ICD-10-CM

## 2025-07-02 DIAGNOSIS — F43.9 STRESS: ICD-10-CM

## 2025-07-02 LAB — TESTOST SERPL-MCNC: 529 NG/DL (ref 304–1227)

## 2025-07-02 PROCEDURE — 99999 PR PBB SHADOW E&M-EST. PATIENT-LVL IV: CPT | Mod: PBBFAC,,, | Performed by: FAMILY MEDICINE

## 2025-07-02 PROCEDURE — 84402 ASSAY OF FREE TESTOSTERONE: CPT

## 2025-07-02 PROCEDURE — 99214 OFFICE O/P EST MOD 30 MIN: CPT | Mod: 25,S$GLB,, | Performed by: FAMILY MEDICINE

## 2025-07-02 PROCEDURE — 36415 COLL VENOUS BLD VENIPUNCTURE: CPT | Mod: PN

## 2025-07-02 PROCEDURE — G2211 COMPLEX E/M VISIT ADD ON: HCPCS | Mod: S$GLB,,, | Performed by: FAMILY MEDICINE

## 2025-07-02 PROCEDURE — 3044F HG A1C LEVEL LT 7.0%: CPT | Mod: CPTII,S$GLB,, | Performed by: FAMILY MEDICINE

## 2025-07-02 PROCEDURE — 84403 ASSAY OF TOTAL TESTOSTERONE: CPT

## 2025-07-02 PROCEDURE — 3074F SYST BP LT 130 MM HG: CPT | Mod: CPTII,S$GLB,, | Performed by: FAMILY MEDICINE

## 2025-07-02 PROCEDURE — 4010F ACE/ARB THERAPY RXD/TAKEN: CPT | Mod: CPTII,S$GLB,, | Performed by: FAMILY MEDICINE

## 2025-07-02 PROCEDURE — 1159F MED LIST DOCD IN RCRD: CPT | Mod: CPTII,S$GLB,, | Performed by: FAMILY MEDICINE

## 2025-07-02 PROCEDURE — 3008F BODY MASS INDEX DOCD: CPT | Mod: CPTII,S$GLB,, | Performed by: FAMILY MEDICINE

## 2025-07-02 PROCEDURE — 99406 BEHAV CHNG SMOKING 3-10 MIN: CPT | Mod: S$GLB,,, | Performed by: FAMILY MEDICINE

## 2025-07-02 PROCEDURE — 3079F DIAST BP 80-89 MM HG: CPT | Mod: CPTII,S$GLB,, | Performed by: FAMILY MEDICINE

## 2025-07-06 ENCOUNTER — RESULTS FOLLOW-UP (OUTPATIENT)
Dept: INTERNAL MEDICINE | Facility: CLINIC | Age: 60
End: 2025-07-06

## 2025-07-07 PROBLEM — F43.9 STRESS: Status: ACTIVE | Noted: 2025-07-07

## 2025-07-07 PROBLEM — E06.3 HASHIMOTO THYROIDITIS: Status: ACTIVE | Noted: 2025-07-07

## 2025-07-07 PROBLEM — R53.82 CHRONIC FATIGUE: Status: ACTIVE | Noted: 2025-07-07

## 2025-07-07 PROBLEM — F17.210 CIGARETTE NICOTINE DEPENDENCE WITHOUT COMPLICATION: Status: ACTIVE | Noted: 2025-07-07

## 2025-07-07 LAB — W FREE TESTOSTERONE: 3.8 PG/ML

## 2025-07-07 NOTE — PROGRESS NOTES
"Subjective:       Patient ID: Zane Allison is a 59 y.o. male.    Chief Complaint: Follow-up (1 month follow up )    History of Present Illness    CHIEF COMPLAINT:  Mr. Allison presents for follow-up on multiple health concerns, including fatigue, smoking cessation, and to discuss family history of cancer and thyroid disease.    HPI:  Mr. Allison reports significant fatigue and poor sleep quality. He underwent a sleep study at the end of 2024 [facility] with results [facility]. He uses Ambien and melatonin as a sleep aid, reporting vivid dreams with melatonin use. Ambien does not significantly affect him.    He uses smoking as a stress reliever. His daughter expresses concern about his smoking habit and its impact on his health. He has discussed smoking cessation with the doctor in previous visits but has not yet quit.    He mentions having back issues. There is a reference to obtaining records from a spine center to review plans for his back.    He reports feeling stressed, which contributes to his smoking habit. He has not engaged in therapy previously and expresses skepticism about its benefits.    The need to clarify his family history of cancer and degenerative thyroid disease is mentioned, which could impact screening recommendations for him and his children.    MEDICATIONS:  Mr. Allison is on Ambien and takes 3 tablets of Melatonin for sleep.    FAMILY HISTORY:  Family history is significant for cancer and degenerative thyroid disease in some family members.    TEST RESULTS:  Mr. Allison underwent a thyroid function test 3 months ago. His testosterone level was previously checked. He underwent a sleep study conducted at [facility]" facility, with results currently pending.    SOCIAL HISTORY:  Smoking: Current smoker      ROS:  ROS as indicated in HPI.        Review of Systems   Constitutional:  Positive for fatigue. Negative for chills and fever.   HENT:  Negative for congestion and " sore throat.    Respiratory:  Negative for cough and shortness of breath.    Cardiovascular:  Negative for chest pain, palpitations and leg swelling.   Gastrointestinal:  Negative for abdominal pain, nausea and vomiting.   Genitourinary:  Negative for dysuria.   Musculoskeletal:  Positive for back pain.   Neurological:  Negative for headaches.       Objective:      Physical Exam  Vitals reviewed.   HENT:      Head: Normocephalic and atraumatic.   Eyes:      General: No scleral icterus.        Right eye: No discharge.         Left eye: No discharge.      Extraocular Movements: Extraocular movements intact.      Conjunctiva/sclera: Conjunctivae normal.      Pupils: Pupils are equal, round, and reactive to light.   Cardiovascular:      Rate and Rhythm: Normal rate and regular rhythm.      Pulses: Normal pulses.      Heart sounds: Normal heart sounds. No murmur heard.     No friction rub. No gallop.   Pulmonary:      Effort: Pulmonary effort is normal. No respiratory distress.      Breath sounds: Normal breath sounds. No stridor. No wheezing, rhonchi or rales.   Chest:      Chest wall: No tenderness.   Musculoskeletal:      Right lower leg: No edema.      Left lower leg: No edema.   Skin:     General: Skin is warm.      Capillary Refill: Capillary refill takes less than 2 seconds.   Neurological:      General: No focal deficit present.      Mental Status: He is alert.   Psychiatric:         Mood and Affect: Mood normal.         Behavior: Behavior normal.         Assessment/Plan:       1. Chronic fatigue    2. Hashimoto thyroiditis    3. Cigarette nicotine dependence without complication    4. Stress      Assessment & Plan    1. Chronic fatigue (Primary)  Chronic, uncontrolled, testosterone level within normal limits  - Testosterone,Total; Future  - Testosterone, Free; Future    2. Hashimoto thyroiditis  Chronic, stable, will continue to monitor for need for levothyroxine     3. Cigarette nicotine dependence without  complication  Chronic, stable, discussed smoking cessation with patient and daughter for 10 minutes of today's visit  - Ambulatory referral/consult to Smoking Cessation Program; Future    4. Stress  Chronic, uncontrolled, advised and discussed benefits of starting therapy  - Ambulatory referral/consult to Psychology; Future    NICOTINE DEPENDENCE AND TOBACCO USE:  - Discussed smoking as a stress reliever and the need for healthier stress management methods.  - Referred the patient to smoking cessation clinic with a licensed nurse or nurse practitioner to help stop smoking.  - Recommend healthier alternatives for stress management.    SLEEP DISORDERS:  - Discussed the patient's lack of quality sleep and pending sleep study results.  - Explored non-pharmacological approaches to manage sleep issues due to reported side effects from sleep medications.  - Explained that melatonin is a natural hormone that aids sleep, while Ambien is known to cause unusual dreams and sleepwalking.  - [facility] a [facility] diagnosis of obstructive sleep apnea as a potential cause of fatigue.  - Will follow up for CPAP therapy if needed based on sleep study results and educated patient on its potential benefits.    BACK PAIN:  - Will provide forms for the patient to complete for records release from spine center in [place] to determine their treatment plans for the patient's back.    STRESS MANAGEMENT:  - Explained the difference between therapists and psychiatrists, emphasizing therapy as a tool for personal growth and stress management.  - Referred the patient to psychotherapy.    FAMILY HISTORY OF CANCER:  - Discussed the importance of knowing family history of cancer for early screening and prevention.  - Will investigate family history of cancer to determine appropriate screening protocols for the patient and children.    FAMILY HISTORY OF ENDOCRINE DISORDERS:  - Discussed the importance of  knowing family history of degenerative thyroid disease for early screening and prevention.  - Assessed thyroid function and testosterone levels to identify potential causes of tiredness.  - Ordered testosterone level and thyroid function tests.    FOLLOW-UP:  - Discussed the importance of addressing health issues proactively, particularly in light of potential future changes to Medicare coverage.  - Follow up in 1 month.              No follow-ups on file.   Future Appointments   Date Time Provider Department Center   7/21/2025  7:40 AM Sherrill Stevens MD [facility]   7/23/2025 10:00 AM [facility]   10/31/2025 11:30 AM Obdulio Wong MD [facility] UROLOGY [facility]       Felipa Avila MD  [facility] [address]  [place] [address] [phone]      This note was generated with the assistance of ambient listening technology. Verbal consent was obtained by the patient and accompanying visitor(s) for the recording of patient appointment to facilitate this note. I attest to having reviewed and edited the generated note for accuracy, though some syntax or spelling errors may persist. Please contact the author of this note for any clarification.

## 2025-07-15 ENCOUNTER — TELEPHONE (OUTPATIENT)
Dept: SMOKING CESSATION | Facility: CLINIC | Age: 60
End: 2025-07-15
Payer: COMMERCIAL

## 2025-07-15 NOTE — TELEPHONE ENCOUNTER
Reminder call regarding scheduled 10 am appointment on 7/23/2025 due to the patient requested a reminder call 1 week in advance. Patient confirmed.

## 2025-07-22 ENCOUNTER — TELEPHONE (OUTPATIENT)
Dept: SMOKING CESSATION | Facility: CLINIC | Age: 60
End: 2025-07-22
Payer: COMMERCIAL

## 2025-07-23 ENCOUNTER — PATIENT MESSAGE (OUTPATIENT)
Dept: SMOKING CESSATION | Facility: CLINIC | Age: 60
End: 2025-07-23
Payer: COMMERCIAL

## 2025-07-23 ENCOUNTER — CLINICAL SUPPORT (OUTPATIENT)
Dept: SMOKING CESSATION | Facility: CLINIC | Age: 60
End: 2025-07-23
Payer: COMMERCIAL

## 2025-07-23 ENCOUNTER — TELEPHONE (OUTPATIENT)
Dept: SMOKING CESSATION | Facility: CLINIC | Age: 60
End: 2025-07-23
Payer: COMMERCIAL

## 2025-07-23 DIAGNOSIS — F17.200 NICOTINE DEPENDENCE: Primary | ICD-10-CM

## 2025-07-23 PROCEDURE — 99999 PR PBB SHADOW E&M-EST. PATIENT-LVL II: CPT | Mod: PBBFAC,,, | Performed by: SPEECH-LANGUAGE PATHOLOGIST

## 2025-07-23 PROCEDURE — 99404 PREV MED CNSL INDIV APPRX 60: CPT | Mod: S$GLB,,, | Performed by: SPEECH-LANGUAGE PATHOLOGIST

## 2025-07-23 RX ORDER — DIPHENHYDRAMINE HCL 25 MG
4 CAPSULE ORAL
Qty: 300 EACH | Refills: 0 | Status: SHIPPED | OUTPATIENT
Start: 2025-07-23

## 2025-07-23 RX ORDER — ASPIRIN/CALCIUM CARB/MAGNESIUM 325 MG
4 TABLET ORAL
Qty: 270 LOZENGE | Refills: 0 | Status: SHIPPED | OUTPATIENT
Start: 2025-07-23

## 2025-07-23 NOTE — PROGRESS NOTES
At SOC, patient reports smoking 6-10 CPD . Assessed CO with patient reporting smoking 9 prior. CO 1. Discussed the role of tobacco cessation program, role of NRT & behavioral changes to assist the patient to reach the goal of being tobacco free. The patient reports willing to utilize 4 MG GUM & 4 MG LOZENGES & will return for a follow up visit.  Education & instruction on the role of the NRT, usage & proper placement of the patch/gum/lozenge technique/dosage instructions. Patient provided a written handout on usage of the NRT. The patient verbalized understanding & willingness to apply. Patient instructed to call CTTS anytime. Follow up visit set with the patient for 8/28/2025 at 8:30 am.

## 2025-07-24 ENCOUNTER — TELEPHONE (OUTPATIENT)
Dept: SMOKING CESSATION | Facility: CLINIC | Age: 60
End: 2025-07-24
Payer: COMMERCIAL

## 2025-07-24 NOTE — TELEPHONE ENCOUNTER
Text to patient to see if he has his schedule to set up a follow up appointment from his appointment on 7/23/2025. Awaiting response

## 2025-08-27 ENCOUNTER — TELEPHONE (OUTPATIENT)
Dept: SMOKING CESSATION | Facility: CLINIC | Age: 60
End: 2025-08-27
Payer: COMMERCIAL

## 2025-08-28 ENCOUNTER — CLINICAL SUPPORT (OUTPATIENT)
Dept: SMOKING CESSATION | Facility: CLINIC | Age: 60
End: 2025-08-28
Payer: COMMERCIAL

## 2025-08-28 DIAGNOSIS — F17.200 NICOTINE DEPENDENCE: Primary | ICD-10-CM

## 2025-08-28 PROCEDURE — 99402 PREV MED CNSL INDIV APPRX 30: CPT | Mod: S$GLB,,, | Performed by: SPEECH-LANGUAGE PATHOLOGIST
